# Patient Record
Sex: FEMALE | Race: WHITE | Employment: OTHER | RURAL
[De-identification: names, ages, dates, MRNs, and addresses within clinical notes are randomized per-mention and may not be internally consistent; named-entity substitution may affect disease eponyms.]

---

## 2017-01-12 ENCOUNTER — OFFICE VISIT (OUTPATIENT)
Dept: FAMILY MEDICINE CLINIC | Age: 77
End: 2017-01-12

## 2017-01-12 ENCOUNTER — TELEPHONE (OUTPATIENT)
Dept: FAMILY MEDICINE CLINIC | Age: 77
End: 2017-01-12

## 2017-01-12 VITALS
DIASTOLIC BLOOD PRESSURE: 72 MMHG | HEART RATE: 87 BPM | HEIGHT: 64 IN | SYSTOLIC BLOOD PRESSURE: 133 MMHG | RESPIRATION RATE: 20 BRPM | BODY MASS INDEX: 31.14 KG/M2 | WEIGHT: 182.4 LBS | TEMPERATURE: 98.4 F | OXYGEN SATURATION: 95 %

## 2017-01-12 DIAGNOSIS — J40 BRONCHITIS: Primary | ICD-10-CM

## 2017-01-12 RX ORDER — AZITHROMYCIN 250 MG/1
TABLET, FILM COATED ORAL
Qty: 6 TAB | Refills: 0 | Status: SHIPPED | OUTPATIENT
Start: 2017-01-12 | End: 2017-01-17

## 2017-01-12 NOTE — MR AVS SNAPSHOT
Visit Information Date & Time Provider Department Dept. Phone Encounter #  
 1/12/2017 10:20 AM Debora Ulloa MD [address] [id number] Upcoming Health Maintenance Date Due DTaP/Tdap/Td series (1 - Tdap) 10/24/1961 GLAUCOMA SCREENING Q2Y 10/24/2005 OSTEOPOROSIS SCREENING (DEXA) 10/24/2005 Pneumococcal 65+ High/Highest Risk (1 of 2 - PCV13) 10/24/2005 INFLUENZA AGE 9 TO ADULT 8/1/2016 MEDICARE YEARLY EXAM 5/6/2017 Allergies as of 1/12/2017  Review Complete On: 1/12/2017 By: Debora Ulloa MD  
  
 Severity Noted Reaction Type Reactions Bactrim [Sulfamethoprim Ds] Medium 06/08/2012   Intolerance Other (comments) Avoids due to mother's severe reaction- (has never had bactrim) Penicillins Medium 06/08/2012   Side Effect Other (comments) Severe headches Ciprofloxacin Low 06/08/2012   Side Effect Nausea Only Flagyl [Metronidazole] Low 06/08/2012   Side Effect Other (comments) Disoriented, unable to function normally Current Immunizations  Never Reviewed Name Date Influenza Vaccine 11/19/2013 Not reviewed this visit You Were Diagnosed With   
  
 Codes Comments Bronchitis    -  Primary ICD-10-CM: O29 ICD-9-CM: 270 Vitals BP Pulse Temp Resp Height(growth percentile) 133/72 (BP 1 Location: Right arm, BP Patient Position: Sitting) 87 98.4 °F (36.9 °C) (Temporal) 20 5' 4\" (1.626 m) Weight(growth percentile) SpO2 BMI OB Status Smoking Status 182 lb 6.4 oz (82.7 kg) 95% 31.31 kg/m2 Postmenopausal Current Every Day Smoker Vitals History BMI and BSA Data Body Mass Index Body Surface Area  
 31.31 kg/m 2 1.93 m 2 Preferred Pharmacy Pharmacy Name Phone [facility] [phone] Your Updated Medication List  
  
   
This list is accurate as of: 1/12/17 11:12 AM.  Always use your most recent med list. amLODIPine 5 mg tablet Commonly known as:  Nicole Perera TAKE ONE TABLET BY MOUTH EVERY DAY  
  
 aspirin 81 mg tablet Take 81 mg by mouth daily (after lunch). atorvastatin 40 mg tablet Commonly known as:  LIPITOR  
TAKE ONE TABLET BY MOUTH EVERY DAY FOR CHOLESTEROL  
  
 azithromycin 250 mg tablet Commonly known as:  Hector Reynaga Follow instructions  
  
 cyclobenzaprine 10 mg tablet Commonly known as:  FLEXERIL Take one twice a day as needed for back/neck spasms FLUoxetine 20 mg tablet Commonly known as:  PROzac Take 1 Tab by mouth daily. metoprolol tartrate 50 mg tablet Commonly known as:  LOPRESSOR  
TAKE 1/2 TABLET BY MOUTH 2 TIMES A DAY FOR BLOOD PRESSURE PriLOSEC OTC 20 mg tablet Generic drug:  omeprazole Take 40 mg by mouth daily. Indications: GASTROESOPHAGEAL REFLUX  
  
 promethazine 25 mg tablet Commonly known as:  PHENERGAN  
TAKE ONE TABLET BY MOUTH EVERY 8 HOURS AS NEEDED FOR NAUSEA Prescriptions Sent to Pharmacy Refills  
 azithromycin (ZITHROMAX) 250 mg tablet 0 Sig: Follow instructions Class: Normal  
 Pharmacy: [address] #: [phone] To-Do List   
 01/12/2017 Imaging:  XR CHEST PA LAT Please provide this summary of care documentation to your next provider. Your primary care clinician is listed as Wayne Tamayo. If you have any questions after today's visit, please call [phone].

## 2017-01-12 NOTE — PROGRESS NOTES
HISTORY OF PRESENT ILLNESS  Maribel Lundberg is a 68 y.o. female. Chief Complaint   Patient presents with    Cough     cough, congestion, ear pain, sore throat, bodyaches, runny nose       HPI  Sick for 4-5 d  With ear pain worse on right  Cough an d congestion    Hx of Lyme ds and Colon Ca    Review of Systems   Constitutional: Negative for fever. HENT: Positive for congestion, ear pain and sore throat. Respiratory: Positive for cough, sputum production (very little) and wheezing. Negative for shortness of breath. Gastrointestinal: Negative for diarrhea, nausea and vomiting. Past Medical History   Diagnosis Date    Anxiety      panic attacks    Arthritis     Cancer [facility]) 2012     hepatic flexure cancer; resected    Chronic pain      mid & upper back, shouldet    GERD (gastroesophageal reflux disease)      controlled with med    H/o Lyme disease 2015    Hematuria     Hypercholesterolemia     Hypertension     Other ill-defined conditions(799.89)      wet macular degeneration    Pneumonia      bronchitis 2011         pneumonia twice in life    Status post colonoscopy with polypectomy      Current Outpatient Prescriptions   Medication Sig Dispense Refill    azithromycin (ZITHROMAX) 250 mg tablet Follow instructions 6 Tab 0    metoprolol tartrate (LOPRESSOR) 50 mg tablet TAKE 1/2 TABLET BY MOUTH 2 TIMES A DAY FOR BLOOD PRESSURE 90 Tab 0    FLUoxetine (PROZAC) 20 mg tablet Take 1 Tab by mouth daily. 30 Tab 5    amLODIPine (NORVASC) 5 mg tablet TAKE ONE TABLET BY MOUTH EVERY DAY 90 Tab 0    atorvastatin (LIPITOR) 40 mg tablet TAKE ONE TABLET BY MOUTH EVERY DAY FOR CHOLESTEROL 90 Tab 1    omeprazole (PRILOSEC OTC) 20 mg tablet Take 40 mg by mouth daily. Indications: GASTROESOPHAGEAL REFLUX      aspirin 81 mg tablet Take 81 mg by mouth daily (after lunch).         promethazine (PHENERGAN) 25 mg tablet TAKE ONE TABLET BY MOUTH EVERY 8 HOURS AS NEEDED FOR NAUSEA 30 Tab 0    cyclobenzaprine (FLEXERIL) 10 mg tablet Take one twice a day as needed for back/neck spasms 60 Tab 1     Allergies   Allergen Reactions    Bactrim [Sulfamethoprim Ds] Other (comments)     Avoids due to mother's severe reaction- (has never had bactrim)    Penicillins Other (comments)     Severe headches    Ciprofloxacin Nausea Only    Flagyl [Metronidazole] Other (comments)     Disoriented, unable to function normally     Visit Vitals    /72 (BP 1 Location: Right arm, BP Patient Position: Sitting)    Pulse 87    Temp 98.4 °F (36.9 °C) (Temporal)    Resp 20    Ht 5' 4\" (1.626 m)    Wt 182 lb 6.4 oz (82.7 kg)    SpO2 95%    BMI 31.31 kg/m2       Physical Exam   Constitutional: She is oriented to person, place, and time. She appears well-developed and well-nourished. No distress. HENT:   Head: Normocephalic and atraumatic. Right Ear: External ear normal.   Left Ear: External ear normal.   Mouth/Throat: No oropharyngeal exudate (mild redness). Eyes: Conjunctivae and EOM are normal. Pupils are equal, round, and reactive to light. Cardiovascular: Normal rate, regular rhythm and normal heart sounds. Pulmonary/Chest: Effort normal. No respiratory distress. She has wheezes (few on left side). She has rales. Lymphadenopathy:     She has no cervical adenopathy. Neurological: She is alert and oriented to person, place, and time. Skin: Skin is warm and dry. Psychiatric: She has a normal mood and affect. Nursing note and vitals reviewed. ASSESSMENT and PLAN    ICD-10-CM ICD-9-CM    1.  Bronchitis J40 490 XR CHEST PA LAT      azithromycin (ZITHROMAX) 250 mg tablet   may add Robitussin DM and Claritin D for sy  RTC if not better or worse

## 2017-01-12 NOTE — PROGRESS NOTES
Chief Complaint   Patient presents with    Cough     cough, congestion, ear pain, sore throat, bodyaches, runny nose     Morning meds taken this Leighton Hernandez LPN

## 2017-01-17 DIAGNOSIS — J40 BRONCHITIS: ICD-10-CM

## 2017-01-27 ENCOUNTER — OFFICE VISIT (OUTPATIENT)
Dept: FAMILY MEDICINE CLINIC | Age: 77
End: 2017-01-27

## 2017-01-27 VITALS
RESPIRATION RATE: 20 BRPM | TEMPERATURE: 98.5 F | HEART RATE: 80 BPM | HEIGHT: 64 IN | OXYGEN SATURATION: 95 % | DIASTOLIC BLOOD PRESSURE: 75 MMHG | BODY MASS INDEX: 31.14 KG/M2 | WEIGHT: 182.4 LBS | SYSTOLIC BLOOD PRESSURE: 119 MMHG

## 2017-01-27 DIAGNOSIS — H69.91 EUSTACHIAN TUBE DISORDER, RIGHT: Primary | ICD-10-CM

## 2017-01-27 DIAGNOSIS — M54.2 NECK PAIN: ICD-10-CM

## 2017-01-27 DIAGNOSIS — F41.9 ANXIETY: ICD-10-CM

## 2017-01-27 RX ORDER — FLUTICASONE PROPIONATE 50 MCG
SPRAY, SUSPENSION (ML) NASAL
Qty: 1 BOTTLE | Refills: 0 | Status: SHIPPED | OUTPATIENT
Start: 2017-01-27 | End: 2020-01-17

## 2017-01-27 RX ORDER — HYDROXYZINE 25 MG/1
25 TABLET, FILM COATED ORAL
Qty: 30 TAB | Refills: 0 | Status: SHIPPED | OUTPATIENT
Start: 2017-01-27 | End: 2017-02-06

## 2017-01-27 NOTE — PROGRESS NOTES
HISTORY OF PRESENT ILLNESS  Loraine Marshall is a 68 y.o. female. Chief Complaint   Patient presents with    Ear Pain     right ear and neck pain       HPI  Better, but right ear still hurts since bronchitis  Was on Zpak, finished it 5 d ago  Bronchitis better  Pain under the ear  Raw  Not with pulling  Drains  No meds for sinus congestion  Taking saline    Stressed last week  And panic attack  Got chest pain with it  On Prozac  Prev on Diazepam prn  Daughter with ETOH and drug problem    Severa Mehul in June 2016 onto right side and right shoulder pain  Did not have Xray    Neck pain  Sharp at top of neck    Review of Systems   Constitutional: Negative for fever. HENT: Positive for congestion and ear pain. Negative for sore throat. No facial tenderness   Respiratory: Positive for cough (just a little left). Negative for shortness of breath and wheezing. Neurological: Positive for headaches (occ sinus pressure). Past Medical History   Diagnosis Date    Anxiety      panic attacks    Arthritis     Cancer [facility]) 2012     hepatic flexure cancer; resected    Chronic pain      mid & upper back, shouldet    GERD (gastroesophageal reflux disease)      controlled with med    H/o Lyme disease 2015    Hematuria     Hypercholesterolemia     Hypertension     Other ill-defined conditions(799.89)      wet macular degeneration    Pneumonia      bronchitis 2011         pneumonia twice in life    Status post colonoscopy with polypectomy      Current Outpatient Prescriptions   Medication Sig Dispense Refill    fluticasone (FLONASE) 50 mcg/actuation nasal spray Use 2 sprays in ea nostril once a day 1 Bottle 0    hydrOXYzine HCl (ATARAX) 25 mg tablet Take 1 Tab by mouth three (3) times daily as needed for Itching for up to 10 days.  Indications: ANXIETY 30 Tab 0    metoprolol tartrate (LOPRESSOR) 50 mg tablet TAKE 1/2 TABLET BY MOUTH 2 TIMES A DAY FOR BLOOD PRESSURE 90 Tab 0    FLUoxetine (PROZAC) 20 mg tablet Take 1 Tab by mouth daily. 30 Tab 5    amLODIPine (NORVASC) 5 mg tablet TAKE ONE TABLET BY MOUTH EVERY DAY 90 Tab 0    atorvastatin (LIPITOR) 40 mg tablet TAKE ONE TABLET BY MOUTH EVERY DAY FOR CHOLESTEROL 90 Tab 1    omeprazole (PRILOSEC OTC) 20 mg tablet Take 40 mg by mouth daily. Indications: GASTROESOPHAGEAL REFLUX      aspirin 81 mg tablet Take 81 mg by mouth daily (after lunch).  cyclobenzaprine (FLEXERIL) 10 mg tablet Take one twice a day as needed for back/neck spasms 60 Tab 1     Allergies   Allergen Reactions    Bactrim [Sulfamethoprim Ds] Other (comments)     Avoids due to mother's severe reaction- (has never had bactrim)    Penicillins Other (comments)     Severe headches    Ciprofloxacin Nausea Only    Flagyl [Metronidazole] Other (comments)     Disoriented, unable to function normally     Visit Vitals    /75 (BP 1 Location: Right arm, BP Patient Position: Sitting)    Pulse 80    Temp 98.5 °F (36.9 °C) (Temporal)    Resp 20    Ht 5' 4\" (1.626 m)    Wt 182 lb 6.4 oz (82.7 kg)    SpO2 95%    BMI 31.31 kg/m2       Physical Exam   Constitutional: She is oriented to person, place, and time. She appears well-developed and well-nourished. No distress. HENT:   Head: Normocephalic and atraumatic. Right Ear: External ear normal.   Left Ear: External ear normal.   Mouth/Throat: Oropharynx is clear and moist. No oropharyngeal exudate. Turbinates swollen   Eyes: Conjunctivae and EOM are normal.   Neck: Normal range of motion. Cardiovascular: Normal rate, regular rhythm and normal heart sounds. Pulmonary/Chest: Effort normal and breath sounds normal.   Musculoskeletal:   Tense Trapezius bilat   Lymphadenopathy:     She has no cervical adenopathy. Neurological: She is alert and oriented to person, place, and time. Skin: Skin is warm and dry. Psychiatric: She has a normal mood and affect. Nursing note and vitals reviewed. ASSESSMENT and PLAN    ICD-10-CM ICD-9-CM    1. Eustachian tube disorder, right H69.91 381.9 fluticasone (FLONASE) 50 mcg/actuation nasal spray   2. Anxiety F41.9 300.00 hydrOXYzine HCl (ATARAX) 25 mg tablet   3.  Neck pain M54.2 723.1    heat and Flexeril prn and trial of massage  Cont Prozac regularly

## 2017-01-27 NOTE — PROGRESS NOTES
Chief Complaint   Patient presents with    Ear Pain     right ear and neck pain     Morning meds taken this Adrian Maxwell LPN

## 2017-02-20 DIAGNOSIS — I10 ESSENTIAL HYPERTENSION: ICD-10-CM

## 2017-02-20 RX ORDER — AMLODIPINE BESYLATE 5 MG/1
TABLET ORAL
Qty: 90 TAB | Refills: 0 | Status: SHIPPED | OUTPATIENT
Start: 2017-02-20 | End: 2017-05-04 | Stop reason: SDUPTHER

## 2017-03-15 RX ORDER — METOPROLOL TARTRATE 50 MG/1
TABLET ORAL
Qty: 90 TAB | Refills: 0 | Status: SHIPPED | OUTPATIENT
Start: 2017-03-15 | End: 2017-06-13 | Stop reason: SDUPTHER

## 2017-03-21 RX ORDER — ATORVASTATIN CALCIUM 40 MG/1
TABLET, FILM COATED ORAL
Qty: 90 TAB | Refills: 0 | Status: SHIPPED | OUTPATIENT
Start: 2017-03-21 | End: 2017-06-17 | Stop reason: SDUPTHER

## 2017-05-04 ENCOUNTER — OFFICE VISIT (OUTPATIENT)
Dept: FAMILY MEDICINE CLINIC | Age: 77
End: 2017-05-04

## 2017-05-04 ENCOUNTER — TELEPHONE (OUTPATIENT)
Dept: FAMILY MEDICINE CLINIC | Age: 77
End: 2017-05-04

## 2017-05-04 VITALS
BODY MASS INDEX: 31.41 KG/M2 | HEIGHT: 64 IN | HEART RATE: 88 BPM | SYSTOLIC BLOOD PRESSURE: 137 MMHG | RESPIRATION RATE: 18 BRPM | DIASTOLIC BLOOD PRESSURE: 77 MMHG | WEIGHT: 184 LBS | TEMPERATURE: 96 F | OXYGEN SATURATION: 100 %

## 2017-05-04 DIAGNOSIS — I10 ESSENTIAL HYPERTENSION: ICD-10-CM

## 2017-05-04 DIAGNOSIS — M54.6 ACUTE BILATERAL THORACIC BACK PAIN: ICD-10-CM

## 2017-05-04 DIAGNOSIS — F41.9 ANXIETY AND DEPRESSION: ICD-10-CM

## 2017-05-04 DIAGNOSIS — R30.0 DYSURIA: Primary | ICD-10-CM

## 2017-05-04 DIAGNOSIS — F32.A ANXIETY AND DEPRESSION: ICD-10-CM

## 2017-05-04 DIAGNOSIS — M54.2 NECK PAIN: ICD-10-CM

## 2017-05-04 DIAGNOSIS — N30.90 CYSTITIS: ICD-10-CM

## 2017-05-04 LAB
BILIRUB UR QL STRIP: NEGATIVE
GLUCOSE UR-MCNC: NEGATIVE MG/DL
KETONES P FAST UR STRIP-MCNC: NEGATIVE MG/DL
PH UR STRIP: 5.5 [PH] (ref 4.6–8)
PROT UR QL STRIP: NEGATIVE MG/DL
SP GR UR STRIP: 1.02 (ref 1–1.03)
UA UROBILINOGEN AMB POC: NORMAL (ref 0.2–1)
URINALYSIS CLARITY POC: CLEAR
URINALYSIS COLOR POC: YELLOW
URINE BLOOD POC: NORMAL
URINE LEUKOCYTES POC: NORMAL
URINE NITRITES POC: NEGATIVE

## 2017-05-04 RX ORDER — IBUPROFEN 200 MG
TABLET ORAL
COMMUNITY
End: 2019-12-20

## 2017-05-04 RX ORDER — AMLODIPINE BESYLATE 5 MG/1
TABLET ORAL
Qty: 90 TAB | Refills: 1 | Status: SHIPPED | OUTPATIENT
Start: 2017-05-04 | End: 2017-11-14 | Stop reason: SDUPTHER

## 2017-05-04 RX ORDER — FLUOXETINE 20 MG/1
20 TABLET ORAL DAILY
Qty: 30 TAB | Refills: 5 | Status: SHIPPED | OUTPATIENT
Start: 2017-05-04 | End: 2020-01-17 | Stop reason: SDUPTHER

## 2017-05-04 RX ORDER — CEPHALEXIN 500 MG/1
500 CAPSULE ORAL 4 TIMES DAILY
Qty: 40 CAP | Refills: 0 | Status: SHIPPED | OUTPATIENT
Start: 2017-05-04 | End: 2017-05-14

## 2017-05-04 RX ORDER — NITROFURANTOIN 25; 75 MG/1; MG/1
100 CAPSULE ORAL 2 TIMES DAILY
Qty: 20 CAP | Refills: 0 | Status: SHIPPED | OUTPATIENT
Start: 2017-05-04 | End: 2017-07-07 | Stop reason: ALTCHOICE

## 2017-05-04 RX ORDER — GABAPENTIN 100 MG/1
CAPSULE ORAL
Qty: 100 CAP | Refills: 5 | Status: SHIPPED | OUTPATIENT
Start: 2017-05-04 | End: 2020-01-17

## 2017-05-04 NOTE — PROGRESS NOTES
Severiano Mediate is a 68 y.o. female who presents with the following:  Chief Complaint   Patient presents with    Ear Pain    Shoulder Pain    Nausea    Hypertension       Ear Pain   The history is provided by the patient (Patient has pain in the neck that radiates into her right ear and when it was massaged by her daughter caused her to be nauseated). Pertinent negatives include no chest pain, no abdominal pain, no headaches and no shortness of breath. Shoulder Pain    The history is provided by the patient (The patient has pain in her thoracic spine that radiates into her shoulders and has been tender over the spine itself). Pertinent negatives include no tingling. Nausea    The history is provided by the patient (The nausea seem to be caused by massaging the neck but is since cleared up). Associated symptoms include arthralgias, myalgias and cough. Pertinent negatives include no chills, no fever, no sweats, no abdominal pain, no diarrhea, no headaches, no URI and no headaches. Hypertension    The history is provided by the patient (Patient's blood pressure is been well controlled as it is today without any edema or chest pain. ). Associated symptoms include nausea. Pertinent negatives include no chest pain, no orthopnea, no palpitations, no PND, no blurred vision, no headaches, no peripheral edema, no dizziness and no shortness of breath. Urinary Pain    The history is provided by the patient (Patient has been having urinary pain and frequency). Associated symptoms include nausea and frequency. Pertinent negatives include no chills, no sweats and no abdominal pain.        Allergies   Allergen Reactions    Bactrim [Sulfamethoprim Ds] Other (comments)     Avoids due to mother's severe reaction- (has never had bactrim)    Penicillins Other (comments)     Severe headches    Ciprofloxacin Nausea Only    Flagyl [Metronidazole] Other (comments)     Disoriented, unable to function normally       Current Outpatient Prescriptions   Medication Sig    ibuprofen (ADVIL) 200 mg tablet Take  by mouth.  amLODIPine (NORVASC) 5 mg tablet TAKE ONE TABLET BY MOUTH EVERY DAY    FLUoxetine (PROZAC) 20 mg tablet Take 1 Tab by mouth daily.  gabapentin (NEURONTIN) 100 mg capsule Take 1-6 capsules nightly for pain    nitrofurantoin, macrocrystal-monohydrate, (MACROBID) 100 mg capsule Take 1 Cap by mouth two (2) times a day. Indications: BACTERIAL URINARY TRACT INFECTION    atorvastatin (LIPITOR) 40 mg tablet TAKE ONE TABLET BY MOUTH EVERY DAY FOR CHOLESTEROL    metoprolol tartrate (LOPRESSOR) 50 mg tablet TAKE 1/2 TABLET BY MOUTH 2 TIMES A DAY FOR BLOOD PRESSURE    cyclobenzaprine (FLEXERIL) 10 mg tablet Take one twice a day as needed for back/neck spasms    omeprazole (PRILOSEC OTC) 20 mg tablet Take 40 mg by mouth daily. Indications: GASTROESOPHAGEAL REFLUX    aspirin 81 mg tablet Take 81 mg by mouth daily (after lunch).  fluticasone (FLONASE) 50 mcg/actuation nasal spray Use 2 sprays in ea nostril once a day     No current facility-administered medications for this visit.         Past Medical History:   Diagnosis Date    Anxiety     panic attacks    Arthritis     Cancer ([facility] [address] 2012    hepatic flexure cancer; resected    Chronic pain     mid & upper back, shouldet    GERD (gastroesophageal reflux disease)     controlled with med    H/o Lyme disease 2015    Hematuria     Hypercholesterolemia     Hypertension     Other ill-defined conditions     wet macular degeneration    Pneumonia     bronchitis 2011         pneumonia twice in life    Status post colonoscopy with polypectomy        Past Surgical History:   Procedure Laterality Date    COLONOSCOPY N/A 9/21/2016    COLON[facility]amar [facility] AMBULATORY OR    HX GI      partial colectomy    HX OTHER SURGICAL      hemorrhoidectomy    HX TONSILLECTOMY      HX TUBAL LIGATION  1971       Family History   Problem Relation Age of Onset    Heart Disease Mother     Kidney Disease Father     Seizures Father        Social History     Social History    Marital status:      Spouse name: N/A    Number of children: N/A    Years of education: N/A     Social History Main Topics    Smoking status: Current Every Day Smoker     Packs/day: 0.50     Years: 56.00    Smokeless tobacco: Never Used    Alcohol use No    Drug use: No    Sexual activity: No     Other Topics Concern    None     Social History Narrative       Review of Systems   Constitutional: Negative for chills and fever. Eyes: Negative for blurred vision. Respiratory: Positive for cough. Negative for shortness of breath. Cardiovascular: Negative for chest pain, palpitations, orthopnea and PND. Gastrointestinal: Positive for nausea. Negative for abdominal pain and diarrhea. Genitourinary: Positive for dysuria and frequency. Musculoskeletal: Positive for arthralgias and myalgias. Neurological: Negative for dizziness, tingling and headaches. Visit Vitals    /77 (BP 1 Location: Left arm, BP Patient Position: Sitting)    Pulse 88    Temp 96 °F (35.6 °C) (Oral)    Resp 18    Ht 5' 4\" (1.626 m)    Wt 184 lb (83.5 kg)    SpO2 100%    BMI 31.58 kg/m2     Physical Exam   Constitutional: She is oriented to person, place, and time and well-developed, well-nourished, and in no distress. Patient is obese and is quite excitable today. HENT:   Head: Normocephalic and atraumatic. Right Ear: External ear normal.   Left Ear: External ear normal.   Mouth/Throat: Oropharynx is clear and moist.   Eyes: Conjunctivae and EOM are normal. Pupils are equal, round, and reactive to light. Right eye exhibits no discharge. Left eye exhibits no discharge. Neck: Normal range of motion. Neck supple. No tracheal deviation present. No thyromegaly present. Cardiovascular: Normal rate, regular rhythm, normal heart sounds and intact distal pulses.   Exam reveals no gallop and no friction rub. No murmur heard. Pulmonary/Chest: Effort normal and breath sounds normal. No respiratory distress. She has no wheezes. She exhibits no tenderness. Abdominal: Soft. Bowel sounds are normal. She exhibits no distension and no mass. There is no tenderness. There is no rebound and no guarding. Musculoskeletal: She exhibits tenderness (The patient is tender in her cervical and thoracic spine). She exhibits no edema. Lymphadenopathy:     She has no cervical adenopathy. Neurological: She is alert and oriented to person, place, and time. She has normal reflexes. No cranial nerve deficit. She exhibits normal muscle tone. Gait normal. Coordination normal.   Skin: Skin is warm and dry. No rash noted. No erythema. No pallor. Psychiatric: Mood, memory, affect and judgment normal.         ICD-10-CM ICD-9-CM    1. Dysuria R30.0 788.1 AMB POC URINALYSIS DIP STICK AUTO W/O MICRO   2. Essential hypertension I10 401.9 amLODIPine (NORVASC) 5 mg tablet   3. Anxiety and depression F41.9 300.00 FLUoxetine (PROZAC) 20 mg tablet    F32.9 311    4. Cystitis N30.90 595.9 AMB POC URINALYSIS DIP STICK AUTO W/O MICRO   5. Neck pain M54.2 723.1 XR SPINE CERV 4 OR 5 V      gabapentin (NEURONTIN) 100 mg capsule   6. Acute bilateral thoracic back pain M54.6 724.1 XR SPINE THORAC 3 V      gabapentin (NEURONTIN) 100 mg capsule       Orders Placed This Encounter    XR SPINE CERV 4 OR 5 V     Standing Status:   Future     Standing Expiration Date:   6/4/2018     Order Specific Question:   Reason for Exam     Answer:   Cervical pain     Order Specific Question:   Is Patient Allergic to Contrast Dye? Answer:   No    XR SPINE THORAC 3 V     Standing Status:   Future     Standing Expiration Date:   6/4/2018     Order Specific Question:   Reason for Exam     Answer:   Thoracic pain in the spine     Order Specific Question:   Is Patient Allergic to Contrast Dye?      Answer:   No    AMB POC URINALYSIS DIP STICK AUTO W/O MICRO    ibuprofen (ADVIL) 200 mg tablet     Sig: Take  by mouth.  amLODIPine (NORVASC) 5 mg tablet     Sig: TAKE ONE TABLET BY MOUTH EVERY DAY     Dispense:  90 Tab     Refill:  1    FLUoxetine (PROZAC) 20 mg tablet     Sig: Take 1 Tab by mouth daily. Dispense:  30 Tab     Refill:  5    gabapentin (NEURONTIN) 100 mg capsule     Sig: Take 1-6 capsules nightly for pain     Dispense:  100 Cap     Refill:  5    nitrofurantoin, macrocrystal-monohydrate, (MACROBID) 100 mg capsule     Sig: Take 1 Cap by mouth two (2) times a day. Indications: BACTERIAL URINARY TRACT INFECTION     Dispense:  20 Cap     Refill:  0    if there is significant arthritis in the spine I would strongly consider using a low dose of Medrol for 5 days to try to get it under control but the gabapentin may actually do the job by itself.     Follow-up Disposition: Not on Tasha Everett MD

## 2017-05-04 NOTE — MR AVS SNAPSHOT
Visit Information Date & Time Provider Department Dept. Phone Encounter #  
 5/4/2017  2:00 PM Ricky Painter MD [facility] Primary Care [phone] [id number] Upcoming Health Maintenance Date Due DTaP/Tdap/Td series (1 - Tdap) 10/24/1961 GLAUCOMA SCREENING Q2Y 10/24/2005 OSTEOPOROSIS SCREENING (DEXA) 10/24/2005 Pneumococcal 65+ High/Highest Risk (1 of 2 - PCV13) 10/24/2005 MEDICARE YEARLY EXAM 5/6/2017 INFLUENZA AGE 9 TO ADULT 8/1/2017 Allergies as of 5/4/2017  Review Complete On: 5/4/2017 By: Ricky Painter MD  
  
 Severity Noted Reaction Type Reactions Bactrim [Sulfamethoprim Ds] Medium 06/08/2012   Intolerance Other (comments) Avoids due to mother's severe reaction- (has never had bactrim) Penicillins Medium 06/08/2012   Side Effect Other (comments) Severe headches Ciprofloxacin Low 06/08/2012   Side Effect Nausea Only Flagyl [Metronidazole] Low 06/08/2012   Side Effect Other (comments) Disoriented, unable to function normally Current Immunizations  Never Reviewed Name Date Influenza Vaccine 11/19/2013 Not reviewed this visit You Were Diagnosed With   
  
 Codes Comments Dysuria    -  Primary ICD-10-CM: R30.0 ICD-9-CM: 788.1 Essential hypertension     ICD-10-CM: I10 
ICD-9-CM: 401.9 Anxiety and depression     ICD-10-CM: F41.9, F32.9 ICD-9-CM: 300.00, 311 Cystitis     ICD-10-CM: N30.90 ICD-9-CM: 595.9 Neck pain     ICD-10-CM: M54.2 ICD-9-CM: 723.1 Acute bilateral thoracic back pain     ICD-10-CM: M54.6 ICD-9-CM: 724.1 Vitals BP Pulse Temp Resp Height(growth percentile) Weight(growth percentile)  
 137/77 (BP 1 Location: Left arm, BP Patient Position: Sitting) 88 96 °F (35.6 °C) (Oral) 18 5' 4\" (1.626 m) 184 lb (83.5 kg) SpO2 BMI OB Status Smoking Status 100% 31.58 kg/m2 Postmenopausal Current Every Day Smoker Vitals History BMI and BSA Data Body Mass Index Body Surface Area 31.58 kg/m 2 1.94 m 2 Preferred Pharmacy Pharmacy Name Phone [place] [address] [phone] Your Updated Medication List  
  
   
This list is accurate as of: 5/4/17  2:51 PM.  Always use your most recent med list. ADVIL 200 mg tablet Generic drug:  ibuprofen Take  by mouth. amLODIPine 5 mg tablet Commonly known as:  [place] TAKE ONE TABLET BY MOUTH EVERY DAY  
  
 aspirin 81 mg tablet Take 81 mg by mouth daily (after lunch). atorvastatin 40 mg tablet Commonly known as:  LIPITOR  
TAKE ONE TABLET BY MOUTH EVERY DAY FOR CHOLESTEROL  
  
 cyclobenzaprine 10 mg tablet Commonly known as:  FLEXERIL Take one twice a day as needed for back/neck spasms FLUoxetine 20 mg tablet Commonly known as:  PROzac Take 1 Tab by mouth daily. fluticasone 50 mcg/actuation nasal spray Commonly known as:  [place] Use 2 sprays in ea nostril once a day  
  
 gabapentin 100 mg capsule Commonly known as:  NEURONTIN Take 1-6 capsules nightly for pain  
  
 metoprolol tartrate 50 mg tablet Commonly known as:  LOPRESSOR  
TAKE 1/2 TABLET BY MOUTH 2 TIMES A DAY FOR BLOOD PRESSURE PriLOSEC OTC 20 mg tablet Generic drug:  omeprazole Take 40 mg by mouth daily. Indications: GASTROESOPHAGEAL REFLUX Prescriptions Sent to Pharmacy Refills  
 amLODIPine (NORVASC) 5 mg tablet 1 Sig: TAKE ONE TABLET BY MOUTH EVERY DAY Class: Normal  
 Pharmacy: [place] [address] Ph #: [phone] FLUoxetine (PROZAC) 20 mg tablet 5 Sig: Take 1 Tab by mouth daily. Class: Normal  
 Pharmacy: [place] [address] Ph #: [phone] Route: Oral  
 gabapentin (NEURONTIN) 100 mg capsule 5  Sig: Take 1-6 capsules nightly for pain  
 Class: Normal  
 Pharmacy: [facility]  #: [phone] We Performed the Following AMB POC URINALYSIS DIP STICK AUTO W/O MICRO [64013 CPT(R)] To-Do List   
 06/04/2017 Imaging:  XR SPINE CERV 4 OR 5 V   
  
 06/04/2017 Imaging:  XR SPINE THORAC 3 V Please provide this summary of care documentation to your next provider. Your primary care clinician is listed as Wiley Martinez. If you have any questions after today's visit, please call [phone].

## 2017-05-04 NOTE — TELEPHONE ENCOUNTER
Needs prior Auth  Drug: Macrobid  Please call [phone]   Patient called stating she's taken keflex & amoxicillin in the past and it has worked.

## 2017-05-06 LAB — BACTERIA UR CULT: NO GROWTH

## 2017-05-09 DIAGNOSIS — M47.812 ARTHRITIS OF NECK: Primary | ICD-10-CM

## 2017-05-10 ENCOUNTER — DOCUMENTATION ONLY (OUTPATIENT)
Dept: FAMILY MEDICINE CLINIC | Age: 77
End: 2017-05-10

## 2017-06-13 RX ORDER — METOPROLOL TARTRATE 50 MG/1
TABLET ORAL
Qty: 90 TAB | Refills: 0 | Status: SHIPPED | OUTPATIENT
Start: 2017-06-13 | End: 2020-01-17 | Stop reason: SDUPTHER

## 2017-06-17 RX ORDER — ATORVASTATIN CALCIUM 40 MG/1
TABLET, FILM COATED ORAL
Qty: 90 TAB | Refills: 0 | Status: SHIPPED | OUTPATIENT
Start: 2017-06-17 | End: 2017-09-16 | Stop reason: SDUPTHER

## 2017-07-07 ENCOUNTER — OFFICE VISIT (OUTPATIENT)
Dept: FAMILY MEDICINE CLINIC | Age: 77
End: 2017-07-07

## 2017-07-07 VITALS
WEIGHT: 181 LBS | TEMPERATURE: 96.7 F | SYSTOLIC BLOOD PRESSURE: 125 MMHG | HEART RATE: 78 BPM | BODY MASS INDEX: 30.9 KG/M2 | HEIGHT: 64 IN | OXYGEN SATURATION: 97 % | RESPIRATION RATE: 20 BRPM | DIASTOLIC BLOOD PRESSURE: 78 MMHG

## 2017-07-07 DIAGNOSIS — L82.1 SEBORRHEIC KERATOSES: ICD-10-CM

## 2017-07-07 DIAGNOSIS — B37.2 CANDIDAL INTERTRIGO: Primary | ICD-10-CM

## 2017-07-07 DIAGNOSIS — Z00.00 MEDICARE ANNUAL WELLNESS VISIT, SUBSEQUENT: ICD-10-CM

## 2017-07-07 RX ORDER — NYSTATIN 100000 U/G
CREAM TOPICAL 2 TIMES DAILY
Qty: 15 G | Refills: 0 | Status: SHIPPED | OUTPATIENT
Start: 2017-07-07 | End: 2019-12-20

## 2017-07-07 NOTE — PROGRESS NOTES
Peter Singh is a 68 y.o. female who presents to the office today with the following:  Chief Complaint   Patient presents with    Rash     groin area, 1 year, worse    Annual Wellness Visit            HPI  Noticed yeast infection like rash on groin intermittent x 1 year. Would feel \"just raw\" under abdomen, says areas stays moist due to \"flap\". Would use otc Monistat and would clear up. Returned with bumps and otc would not work anymore. Has also tried some Peroxide which helped initially. Now is progressively worsening. Is uncomfortable, but not painful. Irritated and itchy. Otherwise feeling well with no other complaints or acute concerns. Review of Systems   Constitutional: Negative for chills and fever. Respiratory: Negative for cough and shortness of breath. Cardiovascular: Negative for chest pain. Gastrointestinal: Negative. Genitourinary: Negative. Skin: Positive for itching and rash. Neurological: Negative for headaches. See HPI. Allergies   Allergen Reactions    Bactrim [Sulfamethoprim Ds] Other (comments)     Avoids due to mother's severe reaction- (has never had bactrim)    Penicillins Other (comments)     Severe headches    Ciprofloxacin Nausea Only    Flagyl [Metronidazole] Other (comments)     Disoriented, unable to function normally       Current Outpatient Prescriptions   Medication Sig    nystatin (MYCOSTATIN) topical cream Apply  to affected area two (2) times a day.  atorvastatin (LIPITOR) 40 mg tablet TAKE ONE TABLET BY MOUTH EVERY DAY FOR CHOLESTEROL    metoprolol tartrate (LOPRESSOR) 50 mg tablet TAKE 1/2 TABLET BY MOUTH 2 TIMES A DAY FOR BLOOD PRESSURE    ibuprofen (ADVIL) 200 mg tablet Take  by mouth.  amLODIPine (NORVASC) 5 mg tablet TAKE ONE TABLET BY MOUTH EVERY DAY    FLUoxetine (PROZAC) 20 mg tablet Take 1 Tab by mouth daily.     cyclobenzaprine (FLEXERIL) 10 mg tablet Take one twice a day as needed for back/neck spasms    omeprazole (PRILOSEC OTC) 20 mg tablet Take 40 mg by mouth daily. Indications: GASTROESOPHAGEAL REFLUX    aspirin 81 mg tablet Take 81 mg by mouth daily (after lunch).  gabapentin (NEURONTIN) 100 mg capsule Take 1-6 capsules nightly for pain    fluticasone (FLONASE) 50 mcg/actuation nasal spray Use 2 sprays in ea nostril once a day     No current facility-administered medications for this visit.         Past Medical History:   Diagnosis Date    Anxiety     panic attacks    Arthritis     Cancer ([facility] [address] 2012    hepatic flexure cancer; resected    Chronic pain     mid & upper back, shouldet    GERD (gastroesophageal reflux disease)     controlled with med    H/o Lyme disease 2015    Hematuria     Hypercholesterolemia     Hypertension     Other ill-defined conditions     wet macular degeneration    Pneumonia     bronchitis 2011         pneumonia twice in life    Status post colonoscopy with polypectomy        Past Surgical History:   Procedure Laterality Date    COLONOSCOPY N/A 9/21/2016    COL[facility] AMBUL[facility] OR    HX GI      partial colectomy    HX OTHER SURGICAL      hemorrhoidectomy    HX TONSILLECTOMY      HX TUBAL LIGATION  1971       Social History     Social History    Marital status:      Spouse name: N/A    Number of children: N/A    Years of education: N/A     Social History Main Topics    Smoking status: Current Every Day Smoker     Packs/day: 0.50     Years: 56.00    Smokeless tobacco: Never Used    Alcohol use No    Drug use: No    Sexual activity: No     Other Topics Concern    None     Social History Narrative       Family History   Problem Relation Age of Onset    Heart Disease Mother     Kidney Disease Father     Seizures Father          Physical Exam:  Visit Vitals    /78 (BP 1 Location: Right arm, BP Patient Position: Sitting)    Pulse 78    Temp 96.7 °F (35.9 °C) (Temporal)    Resp 20    Ht 5' 4\" (1.626 m)    Wt 181 lb (82.1 kg)    SpO2 97%    BMI 31.07 kg/m2     Physical Exam   Constitutional: She is oriented to person, place, and time and well-developed, well-nourished, and in no distress. Obese WF   HENT:   Head: Normocephalic and atraumatic. Eyes: Conjunctivae and EOM are normal.   Neck: Normal range of motion. Neck supple. Cardiovascular: Normal rate and regular rhythm. Pulmonary/Chest: Effort normal and breath sounds normal.   Abdominal: Soft. There is no tenderness. Lymphadenopathy:     She has no cervical adenopathy. Neurological: She is alert and oriented to person, place, and time. Gait normal.   Skin: Skin is warm and dry. Mild erythema lining diffuse intertriginous region under panus. Also with diffuse \"stuck on\" brown/light tan colored warty lesions, consistent with seb k's to diffuse abdomen/trunk. Lesions consistent with AK (small white scale with light erythematous base) also noted on chest pt reports has discussed removal with her PCP. Psychiatric: Mood and affect normal.   Nursing note and vitals reviewed. Assessment/Plan:    ICD-10-CM ICD-9-CM    1. Candidal intertrigo B37.2 112.3 nystatin (MYCOSTATIN) topical cream      REFERRAL TO DERMATOLOGY   2. Seborrheic keratoses L82.1 702.19 REFERRAL TO DERMATOLOGY   3. Medicare annual wellness visit, subsequent Z00.00 V70.0      Recommend tx with topical antifungal, keep area clean/dry, wear loose and breathable clothing. Also discussed health diet and wt loss. She also needs to be seen for tx of other skin lesions and full spot check. Discussed removal and pt would like to go to Dermatology. She has many seb k's to her abdomen and trunk, also with few actinic keratosis looking lesions on chest she say she has already discussed with PCP who tells her is pre-CA. She would like referral, but unsure where she wants to go and will call Monday to let us know location preference.     Follow-up Disposition:  Return if symptoms worsen or fail to improve.     Alannah Ortiz PA-C

## 2017-07-07 NOTE — PATIENT INSTRUCTIONS
Schedule of Personalized Health Plan  (Provide Copy to Patient)  The best way to stay healthy is to live a healthy lifestyle. A healthy lifestyle includes regular exercise, eating a well-balanced diet, keeping a healthy weight and not smoking. Regular physical exams and screening tests are another important way to take care of yourself. Preventive exams provided by health care providers can find health problems early when treatment works best and can keep you from getting certain diseases or illnesses. Preventive services include exams, lab tests, screenings, shots, monitoring and information to help you take care of your own health. All people over 65 should have a pneumonia shot. Pneumonia shots are usually only needed once in a lifetime unless your doctor decides differently. All people over 65 should have a yearly flu shot. People over 65 are at medium to high risk for Hepatitis B. Three shots are needed for complete protection. In addition to your physical exam, some screening tests are recommended:    Bone mass measurement (dexa scan) is recommended every two years  Diabetes Mellitus screening is recommended every year. Glaucoma is an eye disease caused by high pressure in the eye. An eye exam is recommended every year. Cardiovascular screening tests that check your cholesterol and other blood fat (lipid) levels are recommended every five years. Colorectal Cancer screening tests help to find pre-cancerous polyps (growths in the colon) so they can be removed before they turn into cancer. Tests ordered for screening depend on your personal and family history risk factors.     Screening for Breast Cancer is recommended yearly with a mammogram.    Screening for Cervical Cancer is recommended every two years (annually for certain risk factors, such as previous history of STD or abnormal PAP in past 7 years), with a Pelvic Exam with PAP    Here is a list of your current Health Maintenance items with a due date:  Health Maintenance   Topic Date Due    DTaP/Tdap/Td series (1 - Tdap) 10/24/1961    GLAUCOMA SCREENING Q2Y  10/24/2005    OSTEOPOROSIS SCREENING (DEXA)  10/24/2005    Pneumococcal 65+ High/Highest Risk (2 of 2 - PCV13) 03/28/2009    MEDICARE YEARLY EXAM  05/06/2017    INFLUENZA AGE 9 TO ADULT  08/01/2017    ZOSTER VACCINE AGE 60>  Addressed       Patient given ACP to look over. Candidiasis: Care Instructions  Your Care Instructions  Candidiasis (say \"piq-lfw-MW-uh-raquel\") is a yeast infection. Yeast normally lives in your body. But it can cause problems if your body's defenses don't work as they should. Some medicines can increase your chance of getting a yeast infection. These include antibiotics, steroids, and cancer drugs. And some diseases like AIDS and diabetes can make you more likely to get yeast infections. There are different types of yeast infections. Hanh Tanghead is a yeast infection in the mouth. It usually occurs in people with weak immune systems. It causes white patches inside the mouth and throat. Yeast infections of the skin usually occur in skin folds where the skin stays moist. They cause red, oozing patches on your skin. Babies can get these infections under the diaper. People who often wear gloves can get them on their hands. Many women get vaginal yeast infections. They are most common when women take antibiotics. These infections can cause the vagina to itch and burn. They also cause white discharge that looks like cottage cheese. In rare cases, yeast infects the blood. This can cause serious disease. This kind of infection is treated with medicine given through a needle into a vein (IV). After you start treatment, a yeast infection usually goes away quickly. But if your immune system is weak, the infection may come back. Tell your doctor if you get yeast infections often. Follow-up care is a key part of your treatment and safety.  Be sure to make and go to all appointments, and call your doctor if you are having problems. It's also a good idea to know your test results and keep a list of the medicines you take. How can you care for yourself at home? · Take your medicines exactly as prescribed. Call your doctor if you think you are having a problem with your medicine. · Use antibiotics only as directed by your doctor. · Eat yogurt with live cultures. It has bacteria called lactobacillus. It may help prevent some types of yeast infections. · Keep your skin clean and dry. Put powder on moist places. · If you are using a cream or suppository to treat a vaginal yeast infection, don't use condoms or a diaphragm. Use a different type of birth control. · Eat a healthy diet and get regular exercise. This will help keep your immune system strong. When should you call for help? Call your doctor now or seek immediate medical care if:  · You have a fever. · You are pregnant and have signs of a vaginal or urinary tract infection such as:  ¨ Severe itching in your vagina. ¨ Pain during sex or when you urinate. ¨ Unusual discharge from your vagina. ¨ A frequent urge to urinate. ¨ Urine that is cloudy or smells bad. Watch closely for changes in your health, and be sure to contact your doctor if:  · You do not get better as expected. Where can you learn more? Go to http://christel-renuka.info/. Enter U516 in the search box to learn more about \"Candidiasis: Care Instructions. \"  Current as of: October 13, 2016  Content Version: 11.3  © 5041-6135 [employer]. Care instructions adapted under license by [employer] (which disclaims liability or warranty for this information). If you have questions about a medical condition or this instruction, always ask your healthcare professional. [place] [address] any warranty or liability for your use of this information.        Well Visit, Over 72: Care Instructions  Your Care Instructions  Physical exams can help you stay healthy. Your doctor has checked your overall health and may have suggested ways to take good care of yourself. He or she also may have recommended tests. At home, you can help prevent illness with healthy eating, regular exercise, and other steps. Follow-up care is a key part of your treatment and safety. Be sure to make and go to all appointments, and call your doctor if you are having problems. It's also a good idea to know your test results and keep a list of the medicines you take. How can you care for yourself at home? · Reach and stay at a healthy weight. This will lower your risk for many problems, such as obesity, diabetes, heart disease, and high blood pressure. · Get at least 30 minutes of exercise on most days of the week. Walking is a good choice. You also may want to do other activities, such as running, swimming, cycling, or playing tennis or team sports. · Do not smoke. Smoking can make health problems worse. If you need help quitting, talk to your doctor about stop-smoking programs and medicines. These can increase your chances of quitting for good. · Protect your skin from too much sun. When you're outdoors from 10 a.m. to 4 p.m., stay in the shade or cover up with clothing and a hat with a wide brim. Wear sunglasses that block UV rays. Even when it's cloudy, put broad-spectrum sunscreen (SPF 30 or higher) on any exposed skin. · See a dentist one or two times a year for checkups and to have your teeth cleaned. · Wear a seat belt in the car. · Limit alcohol to 2 drinks a day for men and 1 drink a day for women. Too much alcohol can cause health problems. Follow your doctor's advice about when to have certain tests. These tests can spot problems early. For men and women  · Cholesterol.  Your doctor will tell you how often to have this done based on your overall health and other things that can increase your risk for heart attack and stroke. · Blood pressure. Have your blood pressure checked during a routine doctor visit. Your doctor will tell you how often to check your blood pressure based on your age, your blood pressure results, and other factors. · Diabetes. Ask your doctor whether you should have tests for diabetes. · Vision. Experts recommend that you have yearly exams for glaucoma and other age-related eye problems. · Hearing. Tell your doctor if you notice any change in your hearing. You can have tests to find out how well you hear. · Colon cancer tests. Keep having colon cancer tests as your doctor recommends. You can have one of several types of tests. · Heart attack and stroke risk. At least every 4 to 6 years, you should have your risk for heart attack and stroke assessed. Your doctor uses factors such as your age, blood pressure, cholesterol, and whether you smoke or have diabetes to show what your risk for a heart attack or stroke is over the next 10 years. · Osteoporosis. Talk to your doctor about whether you should have a bone density test to find out whether you have thinning bones. Also ask your doctor about whether you should take calcium and vitamin D supplements. For women  · Pap test and pelvic exam. You may no longer need a Pap test. Talk with your doctor about whether to stop or continue to have Pap tests. · Breast exam and mammogram. Ask how often you should have a mammogram, which is an X-ray of your breasts. A mammogram can spot breast cancer before it can be felt and when it is easiest to treat. · Thyroid disease. Talk to your doctor about whether to have your thyroid checked as part of a regular physical exam. Women have an increased chance of a thyroid problem. For men  · Prostate exam. Talk to your doctor about whether you should have a blood test (called a PSA test) for prostate cancer.  Experts disagree on whether men should have this test. Some experts recommend that you discuss the benefits and risks of the test with your doctor. · Abdominal aortic aneurysm. Ask your doctor whether you should have a test to check for an aneurysm. You may need a test if you ever smoked or if your parent, brother, sister, or child has had an aneurysm. When should you call for help? Watch closely for changes in your health, and be sure to contact your doctor if you have any problems or symptoms that concern you. Where can you learn more? Go to http://christel-renuka.info/. Enter D740 in the search box to learn more about \"Well Visit, Over 65: Care Instructions. \"  Current as of: July 19, 2016  Content Version: 11.3  © 9938-4834 [employer]. Care instructions adapted under license by [employer] (which disclaims liability or warranty for this information). If you have questions about a medical condition or this instruction, always ask your healthcare professional. [place] [address] any warranty or liability for your use of this information.

## 2017-07-07 NOTE — PROGRESS NOTES
Daija Castro is a 68 y.o. female and presents for annual Medicare Wellness Visit. Problem List: Reviewed with patient and discussed risk factors. Patient Active Problem List   Diagnosis Code    [facility] C18.[facility]    History of colon cancer, stage III Z85.038    Diverticulosis of sigmoid colon K57.30    Hypercholesterolemia E78.00    Hypertension I10    Encounter for colonoscopy due to history of colon cancer Z12.11, Z85.038       Current medical providers:  Patient Care Team:  Henri Balderas MD as PCP - General (Family Practice)  Konstantin Stout MD ([facility])    PSH: Reviewed with patient  Past Surgical History:   Procedure Laterality Date    COLONOSCOPY N/A 9/21/2016    COLONOSCOPY performed by Dionna Roach MD at [address] HX GI      partial colectomy    HX OTHER SURGICAL      hemorrhoidectomy    HX TONSILLECTOMY      HX TUBAL LIGATION  1971        SH: Reviewed with patient  Social History   Substance Use Topics    Smoking status: Current Every Day Smoker     Packs/day: 0.50     Years: 56.00    Smokeless tobacco: Never Used    Alcohol use No       FH: Reviewed with patient  Family History   Problem Relation Age of Onset    Heart Disease Mother     Kidney Disease Father     Seizures Father        Medications/Allergies: Reviewed with patient  Current Outpatient Prescriptions on File Prior to Visit   Medication Sig Dispense Refill    atorvastatin (LIPITOR) 40 mg tablet TAKE ONE TABLET BY MOUTH EVERY DAY FOR CHOLESTEROL 90 Tab 0    metoprolol tartrate (LOPRESSOR) 50 mg tablet TAKE 1/2 TABLET BY MOUTH 2 TIMES A DAY FOR BLOOD PRESSURE 90 Tab 0    ibuprofen (ADVIL) 200 mg tablet Take  by mouth.  amLODIPine (NORVASC) 5 mg tablet TAKE ONE TABLET BY MOUTH EVERY DAY 90 Tab 1    FLUoxetine (PROZAC) 20 mg tablet Take 1 Tab by mouth daily.  30 Tab 5    cyclobenzaprine (FLEXERIL) 10 mg tablet Take one twice a day as needed for back/neck spasms 60 Tab 1    omeprazole (PRILOSEC OTC) 20 mg tablet Take 40 mg by mouth daily. Indications: GASTROESOPHAGEAL REFLUX      aspirin 81 mg tablet Take 81 mg by mouth daily (after lunch).  gabapentin (NEURONTIN) 100 mg capsule Take 1-6 capsules nightly for pain 100 Cap 5    fluticasone (FLONASE) 50 mcg/actuation nasal spray Use 2 sprays in ea nostril once a day 1 Bottle 0     No current facility-administered medications on file prior to visit. Allergies   Allergen Reactions    Bactrim [Sulfamethoprim Ds] Other (comments)     Avoids due to mother's severe reaction- (has never had bactrim)    Penicillins Other (comments)     Severe headches    Ciprofloxacin Nausea Only    Flagyl [Metronidazole] Other (comments)     Disoriented, unable to function normally       Objective: There were no vitals taken for this visit. There is no height or weight on file to calculate BMI. Assessment of cognitive impairment: Alert and oriented x 3    Depression Screen:   PHQ over the last two weeks 7/7/2017   PHQ Not Done -   Little interest or pleasure in doing things Not at all   Feeling down, depressed or hopeless Not at all   Total Score PHQ 2 0       Fall Risk Assessment:    Fall Risk Assessment, last 12 mths 7/7/2017   Able to walk? Yes   Fall in past 12 months? No   Fall with injury? -   Number of falls in past 12 months -   Fall Risk Score -       Functional Ability:   Does the patient exhibit a steady gait? yes   How long did it take the patient to get up and walk from a sitting position? 2sec     Is the patient self reliant?  (ie can do own laundry, meals, household chores)  yes     Does the patient handle his/her own medications? yes     Does the patient handle his/her own money? yes     Is the patients home safe (ie good lighting, handrails on stairs and bath, etc.)? yes     Did you notice or did patient express any hearing difficulties?    no     Did you notice or did patient express any vision difficulties?   no     Were distance and reading eye charts used? no       Advance Care Planning:   Patient was offered the opportunity to discuss advance care planning:  yes     Does patient have an Advance Directive:  no   If no, did you provide information on Caring Connections? yes       Plan:      No orders of the defined types were placed in this encounter. Health Maintenance   Topic Date Due    DTaP/Tdap/Td series (1 - Tdap) 10/24/1961    GLAUCOMA SCREENING Q2Y  10/24/2005    OSTEOPOROSIS SCREENING (DEXA)  10/24/2005      Pneumococcal 65+ High/Highest Risk (2 of 2 - PCV13) 03/28/2009    MEDICARE YEARLY EXAM  05/06/2017 done 7/7/17    INFLUENZA AGE 9 TO ADULT  08/01/2017    ZOSTER VACCINE AGE 60>  Addressed  Patient advised       *Patient verbalized understanding and agreement with the plan. A copy of the After Visit Summary with personalized health plan was given to the patient today.

## 2017-07-17 ENCOUNTER — TELEPHONE (OUTPATIENT)
Dept: FAMILY MEDICINE CLINIC | Age: 77
End: 2017-07-17

## 2017-07-17 ENCOUNTER — DOCUMENTATION ONLY (OUTPATIENT)
Dept: FAMILY MEDICINE CLINIC | Age: 77
End: 2017-07-17

## 2017-08-23 NOTE — PROGRESS NOTES
Appointment made with Dr Luz Slider 08/21/2017 at [facility] faxed last note and insurance cards CONSULT

## 2017-09-16 RX ORDER — ATORVASTATIN CALCIUM 40 MG/1
TABLET, FILM COATED ORAL
Qty: 90 TAB | Refills: 0 | Status: SHIPPED | OUTPATIENT
Start: 2017-09-16 | End: 2017-12-11 | Stop reason: SDUPTHER

## 2017-12-11 RX ORDER — ATORVASTATIN CALCIUM 40 MG/1
TABLET, FILM COATED ORAL
Qty: 90 TAB | Refills: 0 | Status: SHIPPED | OUTPATIENT
Start: 2017-12-11 | End: 2020-01-17 | Stop reason: SDUPTHER

## 2019-03-15 RX ORDER — PROMETHAZINE HYDROCHLORIDE 25 MG/1
TABLET ORAL
Qty: 30 TAB | Refills: 0 | Status: SHIPPED | OUTPATIENT
Start: 2019-03-15 | End: 2019-12-20

## 2019-12-20 PROBLEM — F41.9 ANXIETY AND DEPRESSION: Status: ACTIVE | Noted: 2017-09-19

## 2019-12-20 PROBLEM — H35.3230 BILATERAL EXUDATIVE AGE-RELATED MACULAR DEGENERATION (HCC): Status: ACTIVE | Noted: 2019-12-20

## 2019-12-20 PROBLEM — F32.A ANXIETY AND DEPRESSION: Status: ACTIVE | Noted: 2017-09-19

## 2020-07-20 PROBLEM — M81.0 AGE-RELATED OSTEOPOROSIS WITHOUT CURRENT PATHOLOGICAL FRACTURE: Status: ACTIVE | Noted: 2020-07-20

## 2020-08-13 ENCOUNTER — PATIENT OUTREACH (OUTPATIENT)
Dept: CASE MANAGEMENT | Age: 80
End: 2020-08-13

## 2020-08-13 NOTE — PROGRESS NOTES
Patient contacted regarding recent discharge and COVID-19 risk. Did not discuss COVID-19 related testing which was not done at this time. Ambulatory Care Manager contacted the patient by telephone to perform post discharge assessment. Verified name and  with patient as identifiers. Patient has following risk factors of: COPD and ED visit 20. ACM reviewed discharge instructions, medical action plan and red flags related to discharge diagnosis. There were no new or changed medications related to discharge diagnosis. Advance Care Planning:   Does patient have an Advance Directive: not on file; education provided     Patient verified healthcare decision makers as correctly listed in chart:  Nitesh Osuna (daughter) [phone], Heidy Ambika (son) [phone], and Caesar Garcia (daughter) [phone]    Education provided regarding infection prevention, and signs and symptoms of COVID-19 and when to seek medical attention with patient who verbalized understanding. Discussed exposure protocols and quarantine from [address] you at higher risk for severe illness  and given an opportunity for questions and concerns. The patient agrees to contact the COVID-19 hotline [phone] or PCP office for questions related to their healthcare. ACM provided contact information for future reference. From CDC: Are you at higher risk for severe illness?  Wash your hands often.  Avoid close contact (6 feet, which is about two arm lengths) with people who are sick.  Put distance between yourself and other people if COVID-19 is spreading in your community.  Clean and disinfect frequently touched surfaces.  Avoid all cruise travel and non-essential air travel.  Call your healthcare professional if you have concerns about COVID-19 and your underlying condition or if you are sick.     For more information on steps you can take to protect yourself, see CDC's How to Protect Yourself Patient/family/caregiver given information for [employer] and agrees to enroll no    Plan for follow-up call in 7-14 days based on severity of symptoms and risk factors.     Phillip Feliciano RN  Ambulatory Care Manager

## 2021-01-27 ENCOUNTER — NURSE TRIAGE (OUTPATIENT)
Dept: OTHER | Facility: CLINIC | Age: 81
End: 2021-01-27

## 2021-01-27 NOTE — TELEPHONE ENCOUNTER
Patient wants to know if she should be tested for Covid since her daughter is positive (very little interaction). Patient is currently being treated by her PCP for an URI. Referred to primary care for advice.

## 2021-01-29 ENCOUNTER — OFFICE VISIT (OUTPATIENT)
Dept: PRIMARY CARE CLINIC | Age: 81
End: 2021-01-29

## 2021-01-29 DIAGNOSIS — Z20.822 ENCOUNTER FOR LABORATORY TESTING FOR COVID-19 VIRUS: Primary | ICD-10-CM

## 2021-01-29 NOTE — PROGRESS NOTES
Pt presents to the flu clinic today requesting a covid test. Pt denies symptoms but has had a possible exposure. Pt declined to see a provider.  Verbal consent received to perform test. ALMA

## 2021-01-31 LAB — SARS-COV-2, NAA: NOT DETECTED

## 2021-02-12 NOTE — PROGRESS NOTES
I have called to give this patient her results. No answer and her VMB is full. Unable to leave a message.

## 2021-03-02 NOTE — PROGRESS NOTES
I have tried again to call this patient, no answer and VMB is full. I see that a letter was sent to the patient. No further actions are needed.

## 2021-03-23 ENCOUNTER — APPOINTMENT (OUTPATIENT)
Dept: CT IMAGING | Age: 81
End: 2021-03-23
Attending: EMERGENCY MEDICINE
Payer: MEDICARE

## 2021-03-23 ENCOUNTER — HOSPITAL ENCOUNTER (EMERGENCY)
Age: 81
Discharge: HOME | End: 2021-03-23
Attending: EMERGENCY MEDICINE
Payer: MEDICARE

## 2021-03-23 VITALS
WEIGHT: 175 LBS | DIASTOLIC BLOOD PRESSURE: 88 MMHG | OXYGEN SATURATION: 97 % | SYSTOLIC BLOOD PRESSURE: 146 MMHG | TEMPERATURE: 98.5 F | BODY MASS INDEX: 29.88 KG/M2 | HEART RATE: 88 BPM | RESPIRATION RATE: 18 BRPM | HEIGHT: 64 IN

## 2021-03-23 DIAGNOSIS — K59.00 CONSTIPATION, UNSPECIFIED CONSTIPATION TYPE: Primary | ICD-10-CM

## 2021-03-23 DIAGNOSIS — R10.12 ABDOMINAL PAIN, LUQ (LEFT UPPER QUADRANT): ICD-10-CM

## 2021-03-23 LAB
ALBUMIN SERPL-MCNC: 3.5 G/DL (ref 3.5–5)
ALBUMIN/GLOB SERPL: 1.1 {RATIO} (ref 1.1–2.2)
ALP SERPL-CCNC: 99 U/L (ref 45–117)
ALT SERPL-CCNC: 17 U/L (ref 12–78)
ANION GAP SERPL CALC-SCNC: 10 MMOL/L (ref 5–15)
AST SERPL-CCNC: 13 U/L (ref 15–37)
BASOPHILS # BLD: 0.1 K/UL (ref 0–0.1)
BASOPHILS NFR BLD: 1 % (ref 0–1)
BILIRUB SERPL-MCNC: 0.5 MG/DL (ref 0.2–1)
BNP SERPL-MCNC: 102 PG/ML (ref 0–450)
BUN SERPL-MCNC: 11 MG/DL (ref 6–20)
BUN/CREAT SERPL: 13 (ref 12–20)
CALCIUM SERPL-MCNC: 9.3 MG/DL (ref 8.5–10.1)
CHLORIDE SERPL-SCNC: 102 MMOL/L (ref 97–108)
CO2 SERPL-SCNC: 27 MMOL/L (ref 21–32)
COMMENT, HOLDF: NORMAL
CREAT SERPL-MCNC: 0.86 MG/DL (ref 0.55–1.02)
DIFFERENTIAL METHOD BLD: ABNORMAL
EOSINOPHIL # BLD: 0.2 K/UL (ref 0–0.4)
EOSINOPHIL NFR BLD: 2 % (ref 0–7)
ERYTHROCYTE [DISTWIDTH] IN BLOOD BY AUTOMATED COUNT: 14.9 % (ref 11.5–14.5)
GLOBULIN SER CALC-MCNC: 3.3 G/DL (ref 2–4)
GLUCOSE SERPL-MCNC: 134 MG/DL (ref 65–100)
HCT VFR BLD AUTO: 43.5 % (ref 35–47)
HGB BLD-MCNC: 14.1 G/DL (ref 11.5–16)
IMM GRANULOCYTES # BLD AUTO: 0 K/UL (ref 0–0.04)
IMM GRANULOCYTES NFR BLD AUTO: 0 % (ref 0–0.5)
INR PPP: 1.1 (ref 0.9–1.1)
LIPASE SERPL-CCNC: 128 U/L (ref 73–393)
LYMPHOCYTES # BLD: 2.4 K/UL (ref 0.8–3.5)
LYMPHOCYTES NFR BLD: 29 % (ref 12–49)
MAGNESIUM SERPL-MCNC: 1.9 MG/DL (ref 1.6–2.4)
MCH RBC QN AUTO: 28.8 PG (ref 26–34)
MCHC RBC AUTO-ENTMCNC: 32.4 G/DL (ref 30–36.5)
MCV RBC AUTO: 89 FL (ref 80–99)
MONOCYTES # BLD: 0.6 K/UL (ref 0–1)
MONOCYTES NFR BLD: 7 % (ref 5–13)
NEUTS SEG # BLD: 5.2 K/UL (ref 1.8–8)
NEUTS SEG NFR BLD: 61 % (ref 32–75)
NRBC # BLD: 0 K/UL (ref 0–0.01)
NRBC BLD-RTO: 0 PER 100 WBC
PLATELET # BLD AUTO: 255 K/UL (ref 150–400)
PMV BLD AUTO: 9 FL (ref 8.9–12.9)
POTASSIUM SERPL-SCNC: 4.4 MMOL/L (ref 3.5–5.1)
PROT SERPL-MCNC: 6.8 G/DL (ref 6.4–8.2)
PROTHROMBIN TIME: 10.6 SEC (ref 9–11.1)
RBC # BLD AUTO: 4.89 M/UL (ref 3.8–5.2)
SAMPLES BEING HELD,HOLD: NORMAL
SODIUM SERPL-SCNC: 139 MMOL/L (ref 136–145)
TROPONIN I SERPL-MCNC: <0.05 NG/ML
WBC # BLD AUTO: 8.4 K/UL (ref 3.6–11)

## 2021-03-23 PROCEDURE — 80053 COMPREHEN METABOLIC PANEL: CPT

## 2021-03-23 PROCEDURE — 85025 COMPLETE CBC W/AUTO DIFF WBC: CPT

## 2021-03-23 PROCEDURE — 83690 ASSAY OF LIPASE: CPT

## 2021-03-23 PROCEDURE — 96374 THER/PROPH/DIAG INJ IV PUSH: CPT

## 2021-03-23 PROCEDURE — 94761 N-INVAS EAR/PLS OXIMETRY MLT: CPT

## 2021-03-23 PROCEDURE — 96361 HYDRATE IV INFUSION ADD-ON: CPT

## 2021-03-23 PROCEDURE — 74011000636 HC RX REV CODE- 636: Performed by: EMERGENCY MEDICINE

## 2021-03-23 PROCEDURE — 74176 CT ABD & PELVIS W/O CONTRAST: CPT

## 2021-03-23 PROCEDURE — 74011250636 HC RX REV CODE- 250/636: Performed by: EMERGENCY MEDICINE

## 2021-03-23 PROCEDURE — 99284 EMERGENCY DEPT VISIT MOD MDM: CPT

## 2021-03-23 PROCEDURE — 85610 PROTHROMBIN TIME: CPT

## 2021-03-23 PROCEDURE — 36415 COLL VENOUS BLD VENIPUNCTURE: CPT

## 2021-03-23 PROCEDURE — 84484 ASSAY OF TROPONIN QUANT: CPT

## 2021-03-23 PROCEDURE — 83735 ASSAY OF MAGNESIUM: CPT

## 2021-03-23 PROCEDURE — 96375 TX/PRO/DX INJ NEW DRUG ADDON: CPT

## 2021-03-23 PROCEDURE — 83880 ASSAY OF NATRIURETIC PEPTIDE: CPT

## 2021-03-23 RX ORDER — MAGNESIUM CITRATE
SOLUTION, ORAL ORAL
Qty: 2 BOTTLE | Refills: 0 | Status: SHIPPED | OUTPATIENT
Start: 2021-03-23 | End: 2022-06-15

## 2021-03-23 RX ORDER — POLYETHYLENE GLYCOL 3350 17 G/17G
17 POWDER, FOR SOLUTION ORAL DAILY
Qty: 235 G | Refills: 0 | Status: SHIPPED | OUTPATIENT
Start: 2021-03-23 | End: 2022-06-15

## 2021-03-23 RX ORDER — MORPHINE SULFATE 4 MG/ML
4 INJECTION INTRAVENOUS ONCE
Status: DISCONTINUED | OUTPATIENT
Start: 2021-03-23 | End: 2021-03-23 | Stop reason: HOSPADM

## 2021-03-23 RX ORDER — ONDANSETRON 4 MG/1
4 TABLET, ORALLY DISINTEGRATING ORAL
Qty: 6 TAB | Refills: 0 | Status: SHIPPED | OUTPATIENT
Start: 2021-03-23 | End: 2021-06-25

## 2021-03-23 RX ORDER — ONDANSETRON 2 MG/ML
8 INJECTION INTRAMUSCULAR; INTRAVENOUS ONCE
Status: COMPLETED | OUTPATIENT
Start: 2021-03-23 | End: 2021-03-23

## 2021-03-23 RX ORDER — SODIUM CHLORIDE 0.9 % (FLUSH) 0.9 %
5-10 SYRINGE (ML) INJECTION ONCE
Status: DISCONTINUED | OUTPATIENT
Start: 2021-03-23 | End: 2021-03-23 | Stop reason: HOSPADM

## 2021-03-23 RX ORDER — DICYCLOMINE HYDROCHLORIDE 10 MG/1
10 CAPSULE ORAL
Qty: 12 CAP | Refills: 0 | Status: SHIPPED | OUTPATIENT
Start: 2021-03-23 | End: 2021-11-01 | Stop reason: SDUPTHER

## 2021-03-23 RX ADMIN — SODIUM CHLORIDE 1000 ML: 9 INJECTION, SOLUTION INTRAVENOUS at 10:02

## 2021-03-23 RX ADMIN — ONDANSETRON 8 MG: 2 INJECTION INTRAMUSCULAR; INTRAVENOUS at 10:02

## 2021-03-23 RX ADMIN — IOHEXOL 50 ML: 240 INJECTION, SOLUTION INTRATHECAL; INTRAVASCULAR; INTRAVENOUS; ORAL at 10:10

## 2021-03-23 NOTE — ED NOTES
Observed pt. Pt currently resting comfortably with call bell and bed rails up. Will continue to monitor as appropriate.

## 2021-03-23 NOTE — ED PROVIDER NOTES
EMERGENCY DEPARTMENT HISTORY AND PHYSICAL EXAM          Date: 3/23/2021  Patient Name: Barbara Adkins    History of Presenting Illness     Chief Complaint   Patient presents with    Abdominal Pain       History Provided By: Patient    HPI: Barbara Adkins is a [de-identified] y.o. female, pmhx high cholesterol, previous hepatic flexure cancer resection, chronic pain, anxiety, who presents via private automobile to the ED c/o abdominal pain    Approximately 1 week ago, patient was putting on her socks when she felt a cramp in the left side of her abdomen. Is it was pretty significant but did go away on its own. The next morning she felt burning in the left upper quadrant which has been fairly persistent since that time. Pain is moving slightly and now she feels pain under her ribs whenever she takes a deep breath. She denies any nausea or vomiting as well as any fevers or chills but she has had loose stools since her colon resection surgery which are intermittently black in color. She notes the last couple of days she thinks they have been more brown and denies any gross blood with bowel movements. Currently she is complaining of 2 out of 10 pain when she lays still but notes that it increases to 6 with deep breaths or movement. Patient denies any coughing, sore throat as well as any sick contacts but she does admit to some decreased appetite and possible weight loss. PCP: Lauren Alcala MD    Allergies: Multiple  Social Hx: +tobacco, -vaping, -EtOH, -Illicit Drugs; There are no other complaints, changes, or physical findings at this time. Current Outpatient Medications   Medication Sig Dispense Refill    magnesium citrate solution Drink entire bottle. If you have not had a large amount of stool output within an hour, drink the second bottle. 2 Bottle 0    polyethylene glycol (Miralax) 17 gram/dose powder Take 17 g by mouth daily.  1 tablespoon with 8 oz of water daily 235 g 0    dicyclomine (BENTYL) 10 mg capsule Take 1 Cap by mouth four (4) times daily as needed for Abdominal Cramps. 12 Cap 0    ondansetron (ZOFRAN ODT) 4 mg disintegrating tablet Take 1 Tab by mouth every eight (8) hours as needed for Nausea. 6 Tab 0    atorvastatin (LIPITOR) 40 mg tablet TAKE 1 TABLET BY MOUTH EVERY DAY FOR CHOLESTEROL 90 Tab 0    FLUoxetine (PROzac) 20 mg capsule TAKE 1 CAPSULE BY MOUTH EVERY DAY 90 Cap 0    metoprolol tartrate (LOPRESSOR) 50 mg tablet TAKE 1/2 TABLET BY MOUTH 2 TIMES A DAY FOR BLOOD PRESSURE 90 Tab 0    amLODIPine (NORVASC) 5 mg tablet TAKE 1 TABLET BY MOUTH EVERY DAY 90 Tab 0    FLUoxetine (PROzac) 10 mg capsule Take one capsule along with a 20 mg capsule daily 305 Cap 5    promethazine (PHENERGAN) 12.5 mg tablet TAKE 1 TABLET BY MOUTH EVERY 6 HOURS AS NEEDED FOR NAUSEA OR VOMITING FOR UP TO 7 DAYS 30 Tab 0    alendronate (FOSAMAX) 70 mg tablet Take 1 Tab by mouth every seven (7) days. Take in am with plain 8-12 oz plain water on empty stomach. Do not eat,drink, or lie down for 30 minutes after taking. 13 Tab 3    albuterol (ProAir HFA) 90 mcg/actuation inhaler INHALE 2 PUFFS BY MOUTH EVERY 4 HOURS AS NEEDED FOR COUGH/WHEEZE      traZODone (DESYREL) 50 mg tablet 1/2 tab at bedtime. May increase to 1 tab nightly (Patient taking differently: 1/4 tab at bedtime. May increase to 1 tab nightly) 45 Tab 1    albuterol (ACCUNEB) 1.25 mg/3 mL nebu Take 3 mL by inhalation every four (4) hours as needed for Wheezing. 25 Each 0    omeprazole (PRILOSEC OTC) 20 mg tablet Take 20 mg by mouth daily. Indications: gastroesophageal reflux disease      aspirin 81 mg tablet Take 81 mg by mouth daily (after lunch).            Past History     Past Medical History:  Past Medical History:   Diagnosis Date    Age-related osteoporosis without current pathological fracture 7/20/2020    Hips. 7/2020    Anxiety     panic attacks    Arthritis     Bilateral exudative age-related macular degeneration ([address] 12/20/2019    Cancer [facility]) 2012    hepatic flexure cancer; resected    Chronic pain     mid & upper back, shouldet    GERD (gastroesophageal reflux disease)     controlled with med    H/o Lyme disease 2015    Hematuria     Hypercholesterolemia     Hypertension     Pneumonia     bronchitis 2011         pneumonia twice in life    Status post colonoscopy with polypectomy        Past Surgical History:  Past Surgical History:   Procedure Laterality Date    COLONOSCOPY N/A 9/21/2016    COLONOSCOPY performed by Silvino Peña MD at [facility] AMBULATORY OR    HX GI      partial colectomy    HX OTHER SURGICAL      hemorrhoidectomy    HX TONSILLECTOMY      HX TUBAL LIGATION  1971       Family History:  Family History   Problem Relation Age of Onset    Heart Disease Mother     Kidney Disease Father     Seizures Father        Social History:  Social History     Tobacco Use    Smoking status: Current Every Day Smoker     Packs/day: 0.50     Years: 56.00     Pack years: 28.00    Smokeless tobacco: Never Used   Substance Use Topics    Alcohol use: No    Drug use: No       Allergies: Allergies   Allergen Reactions    Bactrim [Sulfamethoprim Ds] Other (comments)     Avoids due to mother's severe reaction- (has never had bactrim)    Penicillins Other (comments)     Severe headches    Sulfasalazine Drowsiness     Other reaction(s): Other (comments)  Avoids due to mother's severe reaction- (has never had bactrim)    Ciprofloxacin Nausea Only    Flagyl [Metronidazole] Other (comments)     Disoriented, unable to function normally         Review of Systems   Review of Systems   Constitutional: Positive for appetite change and fatigue. Negative for activity change, chills, fever and unexpected weight change. HENT: Negative for congestion. Eyes: Negative for pain and visual disturbance. Respiratory: Negative for cough and shortness of breath. Cardiovascular: Negative for chest pain.    Gastrointestinal: Positive for abdominal pain and diarrhea. Negative for nausea and vomiting. Genitourinary: Negative for dysuria. Musculoskeletal: Negative for back pain. Skin: Negative for rash. Neurological: Negative for headaches. Physical Exam   Physical Exam  Vitals signs and nursing note reviewed. Constitutional:       Appearance: She is well-developed. She is not diaphoretic. Comments: Morbidly obese elderly female who appears comfortable in bed, awake and alert with fairly normal vital signs in mild to moderate distress secondary to pain   HENT:      Head: Normocephalic and atraumatic. Eyes:      General:         Right eye: No discharge. Left eye: No discharge. Conjunctiva/sclera: Conjunctivae normal.      Pupils: Pupils are equal, round, and reactive to light. Neck:      Musculoskeletal: Normal range of motion and neck supple. Cardiovascular:      Rate and Rhythm: Normal rate and regular rhythm. Heart sounds: Normal heart sounds. No murmur. Pulmonary:      Effort: Pulmonary effort is normal. No respiratory distress. Breath sounds: Normal breath sounds. No wheezing or rales. Abdominal:      General: Abdomen is protuberant. Bowel sounds are normal. There is no distension or abdominal bruit. Palpations: Abdomen is soft. Tenderness: There is abdominal tenderness in the left upper quadrant and left lower quadrant. Negative signs include Justice's sign and McBurney's sign. Hernia: Hernia: No obvious hernia palpated. Musculoskeletal: Normal range of motion. Right lower leg: No edema. Left lower leg: No edema. Skin:     General: Skin is warm and dry. Findings: No rash. Neurological:      Mental Status: She is alert and oriented to person, place, and time. Cranial Nerves: No cranial nerve deficit. Motor: No abnormal muscle tone.        Diagnostic Study Results     Labs -     Recent Results (from the past 12 hour(s))   CBC WITH AUTOMATED DIFF Collection Time: 03/23/21  9:52 AM   Result Value Ref Range    WBC 8.4 3.6 - 11.0 K/uL    RBC 4.89 3.80 - 5.20 M/uL    HGB 14.1 11.5 - 16.0 g/dL    HCT 43.5 35.0 - 47.0 %    MCV 89.0 80.0 - 99.0 FL    MCH 28.8 26.0 - 34.0 PG    MCHC 32.4 30.0 - 36.5 g/dL    RDW 14.9 (H) 11.5 - 14.5 %    PLATELET 130 376 - 046 K/uL    MPV 9.0 8.9 - 12.9 FL    NRBC 0.0 0  WBC    ABSOLUTE NRBC 0.00 0.00 - 0.01 K/uL    NEUTROPHILS 61 32 - 75 %    LYMPHOCYTES 29 12 - 49 %    MONOCYTES 7 5 - 13 %    EOSINOPHILS 2 0 - 7 %    BASOPHILS 1 0 - 1 %    IMMATURE GRANULOCYTES 0 0.0 - 0.5 %    ABS. NEUTROPHILS 5.2 1.8 - 8.0 K/UL    ABS. LYMPHOCYTES 2.4 0.8 - 3.5 K/UL    ABS. MONOCYTES 0.6 0.0 - 1.0 K/UL    ABS. EOSINOPHILS 0.2 0.0 - 0.4 K/UL    ABS. BASOPHILS 0.1 0.0 - 0.1 K/UL    ABS. IMM. GRANS. 0.0 0.00 - 0.04 K/UL    DF AUTOMATED     PROTHROMBIN TIME + INR    Collection Time: 03/23/21  9:52 AM   Result Value Ref Range    INR 1.1 0.9 - 1.1      Prothrombin time 10.6 9.0 - 69.3 sec   METABOLIC PANEL, COMPREHENSIVE    Collection Time: 03/23/21  9:52 AM   Result Value Ref Range    Sodium 139 136 - 145 mmol/L    Potassium 4.4 3.5 - 5.1 mmol/L    Chloride 102 97 - 108 mmol/L    CO2 27 21 - 32 mmol/L    Anion gap 10 5 - 15 mmol/L    Glucose 134 (H) 65 - 100 mg/dL    BUN 11 6 - 20 MG/DL    Creatinine 0.86 0.55 - 1.02 MG/DL    BUN/Creatinine ratio 13 12 - 20      GFR est AA >60 >60 ml/min/1.73m2    GFR est non-AA >60 >60 ml/min/1.73m2    Calcium 9.3 8.5 - 10.1 MG/DL    Bilirubin, total 0.5 0.2 - 1.0 MG/DL    ALT (SGPT) 17 12 - 78 U/L    AST (SGOT) 13 (L) 15 - 37 U/L    Alk.  phosphatase 99 45 - 117 U/L    Protein, total 6.8 6.4 - 8.2 g/dL    Albumin 3.5 3.5 - 5.0 g/dL    Globulin 3.3 2.0 - 4.0 g/dL    A-G Ratio 1.1 1.1 - 2.2     LIPASE    Collection Time: 03/23/21  9:52 AM   Result Value Ref Range    Lipase 128 73 - 393 U/L   MAGNESIUM    Collection Time: 03/23/21  9:52 AM   Result Value Ref Range    Magnesium 1.9 1.6 - 2.4 mg/dL   TROPONIN I Collection Time: 03/23/21  9:52 AM   Result Value Ref Range    Troponin-I, Qt. <0.05 <0.05 ng/mL   NT-PRO BNP    Collection Time: 03/23/21  9:52 AM   Result Value Ref Range    NT pro- 0 - 450 PG/ML   SAMPLES BEING HELD    Collection Time: 03/23/21  9:52 AM   Result Value Ref Range    SAMPLES BEING HELD 1SST, 1RED     COMMENT        Add-on orders for these samples will be processed based on acceptable specimen integrity and analyte stability, which may vary by analyte. Radiologic Studies -   CT ABD PELV WO CONT   Final Result   1. Normal sized kidneys. Persistence of the previously described bilateral renal   cysts. No evidence of urolithiasis or hydronephrotic change. Findings suggestive   of the presence of a tiny hyperdense cyst involving the right kidney. 2. Continued evidence of cholelithiasis. 3. Normal appearance of the pancreas. 4. Normal sized spleen. CT Results  (Last 48 hours)               03/23/21 1206  CT ABD PELV WO CONT Final result    Impression:  1. Normal sized kidneys. Persistence of the previously described bilateral renal   cysts. No evidence of urolithiasis or hydronephrotic change. Findings suggestive   of the presence of a tiny hyperdense cyst involving the right kidney. 2. Continued evidence of cholelithiasis. 3. Normal appearance of the pancreas. 4. Normal sized spleen. Narrative:  EXAM: CT ABD PELV WO CONT       INDICATION: LUQ pain with SOB       COMPARISON: CT examination of the abdomen and pelvis dated 7/11/2019       CONTRAST:  None. TECHNIQUE:    Thin axial images were obtained through the abdomen and pelvis. Coronal and   sagittal reformats were generated. Oral contrast was administered. CT dose   reduction was achieved through use of a standardized protocol tailored for this   examination and automatic exposure control for dose modulation.         The absence of intravenous contrast material reduces the sensitivity for   evaluation of the vasculature and solid organs. FINDINGS: The images of the lung bases revealed no evidence of nodularity. The liver measures 16.4 cm longitudinally. No focal hepatic lesions are noted on   this noncontrasted examination. There continues to be evidence of   cholelithiasis. A few normal caliber, partially contrast-filled loops of bowel   are interposed between the anterior aspect of the liver and the abdominal wall. The pancreas is normal in appearance. The spleen measures 9.1 cm longitudinally. No focal splenic lesions are noted on this noncontrasted examination. The   kidneys are normal in size. The previously described multiple cystic lesions   involving the kidneys are again identified. The peripheral, curvilinear   calcification associated with the moderately large left renal cyst is again   noted. As seen on axial image 30, a very small (0.6 cm) focal radiopaque density   is associated with the cortex of the right kidney. This is suggestive of a   hyperdense cyst. There is no evidence of hydronephrotic change. The urinary   bladder is normal in appearance. A small, fat-containing inguinal hernia is   noted on the left. In retrospect, this was present at the time of the previous   examination and has not changed in appearance. No loops of bowel are noted   within this small hernia. The uterus is normal in appearance. A moderate amount   of gas and fecal material is noted within the colon. There is no evidence of   intestinal obstruction or free intraperitoneal air. CXR Results  (Last 48 hours)    None            Medical Decision Making   I am the first provider for this patient. I reviewed the vital signs, available nursing notes, past medical history, past surgical history, family history and social history. Vital Signs-Reviewed the patient's vital signs.   Patient Vitals for the past 12 hrs:   Temp Pulse Resp BP SpO2   03/23/21 1236  88  (!) 146/88 97 %   03/23/21 1100    135/81 98 %   03/23/21 1030    (!) 146/88 98 %   03/23/21 1000    139/85 98 %   03/23/21 0941 98.5 °F (36.9 °C) 94 18 (!) 153/92 98 %       Pulse Oximetry Analysis - 98% on RA    Records Reviewed: Nursing Notes, Old Medical Records, Previous Radiology Studies and Previous Laboratory Studies    Provider Notes (Medical Decision Making):   MDM: Elderly female presenting with abdominal pain and tenderness in the left side. She does not have any gross blood in stool evaluation. Heart rate is slightly elevated but respiratory rate, blood pressure and oxygenation are within normal limits and thus patient does not meet SIRS criteria. Monitor cardiac rhythm, vital signs closely while awaiting lab evaluation for infection. CT to be obtained to rule out diverticulitis, incarcerated hernia, small bowel obstruction. ED Course:   Initial assessment performed. The patients presenting problems have been discussed, and they are in agreement with the care plan formulated and outlined with them. I have encouraged them to ask questions as they arise throughout their visit. PROGRESS NOTE:    ED Course as of Mar 23 1656   Tue Mar 23, 2021   0959 Patient has made staff aware that she is \"allergic\" to contrast because it makes her feel hot and flushed. It was explained to her that she would need the contrast but she was told by Dr. Román Yarbrough that she does not ever have to have it again. Discussed with radiology and we will do oral contrast for the study. CBC reviewed with normal result. [JT]   1233 Doing well and feeling better. CT without any acute pathology and shows constipation. Discussed with patient and her daughter. Prescription sent to pharmacy. Return precautions reviewed. [JT]      ED Course User Index  [JT] Arely Michael., MD        Discharge note:  Pt re-evaluated and noted to be feeling better, ready for discharge. Updated pt on all final lab and imaging findings.   Will follow up as instructed. All questions have been answered, pt voiced understanding and agreement with plan. Specific return precautions provided as well as instructions to return to the ED should sx worsen at any time. Vital signs stable for discharge. Critical Care Time:   0      Diagnosis     Clinical Impression:   1. Constipation, unspecified constipation type    2. Abdominal pain, LUQ (left upper quadrant)        PLAN:  1. Discharge Medication List as of 3/23/2021 12:29 PM      START taking these medications    Details   magnesium citrate solution Drink entire bottle. If you have not had a large amount of stool output within an hour, drink the second bottle., Normal, Disp-2 Bottle, R-0      polyethylene glycol (Miralax) 17 gram/dose powder Take 17 g by mouth daily. 1 tablespoon with 8 oz of water daily, Normal, Disp-235 g, R-0      dicyclomine (BENTYL) 10 mg capsule Take 1 Cap by mouth four (4) times daily as needed for Abdominal Cramps., Normal, Disp-12 Cap, R-0         CONTINUE these medications which have NOT CHANGED    Details   atorvastatin (LIPITOR) 40 mg tablet TAKE 1 TABLET BY MOUTH EVERY DAY FOR CHOLESTEROL, Normal, Disp-90 Tab, R-0      !! FLUoxetine (PROzac) 20 mg capsule TAKE 1 CAPSULE BY MOUTH EVERY DAY, Normal, Disp-90 Cap, R-0      metoprolol tartrate (LOPRESSOR) 50 mg tablet TAKE 1/2 TABLET BY MOUTH 2 TIMES A DAY FOR BLOOD PRESSURE, Normal, Disp-90 Tab, R-0      amLODIPine (NORVASC) 5 mg tablet TAKE 1 TABLET BY MOUTH EVERY DAY, Normal, Disp-90 Tab, R-0      !! FLUoxetine (PROzac) 10 mg capsule Take one capsule along with a 20 mg capsule daily, Normal, Disp-305 Cap,R-5      promethazine (PHENERGAN) 12.5 mg tablet TAKE 1 TABLET BY MOUTH EVERY 6 HOURS AS NEEDED FOR NAUSEA OR VOMITING FOR UP TO 7 DAYS, Normal, Disp-30 Tab,R-0      alendronate (FOSAMAX) 70 mg tablet Take 1 Tab by mouth every seven (7) days. Take in am with plain 8-12 oz plain water on empty stomach.  Do not eat,drink, or lie down for 30 minutes after taking., Normal, Disp-13 Tab,R-3      albuterol (ProAir HFA) 90 mcg/actuation inhaler INHALE 2 PUFFS BY MOUTH EVERY 4 HOURS AS NEEDED FOR COUGH/WHEEZE, Historical Med      traZODone (DESYREL) 50 mg tablet 1/2 tab at bedtime. May increase to 1 tab nightly, Normal, Disp-45 Tab, R-1      albuterol (ACCUNEB) 1.25 mg/3 mL nebu Take 3 mL by inhalation every four (4) hours as needed for Wheezing., Normal, Disp-25 Each, R-0      omeprazole (PRILOSEC OTC) 20 mg tablet Take 20 mg by mouth daily. Indications: gastroesophageal reflux disease, Historical Med      aspirin 81 mg tablet Take 81 mg by mouth daily (after lunch). Historical Med, 81 mg       !! - Potential duplicate medications found. Please discuss with provider. 2.   Follow-up Information     Follow up With Specialties Details Why Contact Info    Lauren Alcala MD Family Medicine  As needed [address] [address]  [phone]      [address] [place] [address] Emergency Medicine  If symptoms worsen with fever, vomiting or increased pain [address]  [phone]        Return to ED if worse     Disposition:  Home       Please note, this dictation was completed with [email or web address], the computer voice recognition software. Quite often unanticipated grammatical, syntax, homophones, and other interpretive errors are inadvertently transcribed by the computer software. Please disregard these errors. Please excuse any errors that have escaped final proof reading.

## 2021-03-23 NOTE — ED NOTES
Patient educated on the medication(s) he/she is going to receive, allergies reviewed/verified and patient medicated as ordered. Side rails up and call light within reach. Will continue to monitor. Pt declined morphine at this time, pt educated that if her pain gets worse and she wants the pain medication to please let staff aware, pt vocalized understanding.

## 2021-03-23 NOTE — ED NOTES
1st contact with this patient:  Patient identified. Patient noted to have arrived by POV and requested a wheelchair. Pt reports lower abd pain X 1 week but reports the pain is worse today and on movement. Pt also complaints of left rib pain on inspiration and moving turning to the left. Pt reports history of colon cancer. Pt does also report + nausea and decreased appetite. Pt is awake alert and oriented x 4, speaking in full complete sentences  NAD. Pt educated on ER flow and provider at bedside.   Pt assisted into a gown

## 2021-03-29 ENCOUNTER — TRANSCRIBE ORDER (OUTPATIENT)
Dept: REGISTRATION | Age: 81
End: 2021-03-29

## 2021-03-29 ENCOUNTER — HOSPITAL ENCOUNTER (OUTPATIENT)
Dept: GENERAL RADIOLOGY | Age: 81
Discharge: HOME | End: 2021-03-29
Payer: MEDICARE

## 2021-03-29 DIAGNOSIS — G89.29 CHRONIC BILATERAL THORACIC BACK PAIN: ICD-10-CM

## 2021-03-29 DIAGNOSIS — M54.2 CERVICAL PAIN (NECK): ICD-10-CM

## 2021-03-29 DIAGNOSIS — M54.6 CHRONIC BILATERAL THORACIC BACK PAIN: ICD-10-CM

## 2021-03-29 DIAGNOSIS — M54.2 CERVICAL PAIN (NECK): Primary | ICD-10-CM

## 2021-03-29 PROCEDURE — 72070 X-RAY EXAM THORAC SPINE 2VWS: CPT

## 2021-03-29 PROCEDURE — 72040 X-RAY EXAM NECK SPINE 2-3 VW: CPT

## 2021-06-25 ENCOUNTER — VIRTUAL VISIT (OUTPATIENT)
Dept: FAMILY MEDICINE CLINIC | Age: 81
End: 2021-06-25
Payer: MEDICARE

## 2021-06-25 DIAGNOSIS — F32.A ANXIETY AND DEPRESSION: ICD-10-CM

## 2021-06-25 DIAGNOSIS — J01.90 ACUTE NON-RECURRENT SINUSITIS, UNSPECIFIED LOCATION: Primary | ICD-10-CM

## 2021-06-25 DIAGNOSIS — R19.5 LOOSE STOOLS: ICD-10-CM

## 2021-06-25 DIAGNOSIS — F51.01 PRIMARY INSOMNIA: ICD-10-CM

## 2021-06-25 DIAGNOSIS — F41.9 ANXIETY AND DEPRESSION: ICD-10-CM

## 2021-06-25 PROCEDURE — G2025 DIS SITE TELE SVCS RHC/FQHC: HCPCS | Performed by: PHYSICIAN ASSISTANT

## 2021-06-25 RX ORDER — HYDROXYZINE 25 MG/1
25 TABLET, FILM COATED ORAL
Qty: 30 TABLET | Refills: 0 | Status: SHIPPED | OUTPATIENT
Start: 2021-06-25 | End: 2022-06-15

## 2021-06-25 RX ORDER — AZITHROMYCIN 250 MG/1
TABLET, FILM COATED ORAL
Qty: 6 TABLET | Refills: 0 | Status: SHIPPED | OUTPATIENT
Start: 2021-06-25 | End: 2021-08-10 | Stop reason: ALTCHOICE

## 2021-06-25 NOTE — PROGRESS NOTES
Caterina Chavez is a [de-identified] y.o. female, evaluated via audio-only technology on 6/25/2021 for Sinus Infection (phone call only [phone]) and Diarrhea  . Assessment & Plan:   Diagnoses and all orders for this visit:    1. Acute non-recurrent sinusitis, unspecified location  -     azithromycin (ZITHROMAX) 250 mg tablet; Take 2 tablets today, then take 1 tablet daily    2. Anxiety and depression  -     hydrOXYzine HCL (ATARAX) 25 mg tablet; Take 1 Tablet by mouth nightly as needed for Anxiety. Indications: anxious    3. Primary insomnia  -     hydrOXYzine HCL (ATARAX) 25 mg tablet; Take 1 Tablet by mouth nightly as needed for Anxiety. Indications: anxious    4. Loose stools      Encourage rest & fluids. rec try a few days of conservative tx with things such as humidifier, warm compresses, nasal spray, decongestants prior to proceeding with emperic abx tx. Discussed otc medications for symptomatic relief as well. Will attempt prn hydroxyzine first for stress/ insomnia. Pt will not take other sleep aids in meantime. Counseled pt on potential medication AEs/interactions. Caution regarding sedation and safety. May start at half dose. Discussed dosing options moving fwd as well.  (pt is not taking nausea meds and reports no h/o QT or heart rhthym issues, still notified and told her she could try before or after abx tx). Recommended she try counseling for ongoing stress/anxiety. Pt will f/u with her PCP this week. She agrees to RTO/seek medical attn if sxs persist/worsen or develops any additional sxs/concerns. Instructed her to seek care over weekend if any sxs worsen. The complexity of medical decision making for this visit is moderate       Subjective:   Pt presents with \"A couple things going on\"  Thinks she has a sinus infection with HA, drainage, R ear pain x 1 week.   Did have eye drops from getting from her eye doctor and noticed the congestion after that so is not sure if its the drops or an infection. Tylenol has resolved the HA. Can push on her forehead and causes mucus to drain a \"creamy color\"  No sob/wheeze/cp/neck pain. Denies fever/chills, body aches. Did have tactile fever last night, but \"if anything low grade and does not feel that way now\"  Cannot tolerate PCN or Bactrim. Has done well z-pack in the past.    Also, feels her stress has become worse and cannot sleep. Worried about her son who is very sick. Is \"stressed to the limit\" and says \"its not anxiety its stress\" denies depression or other psych hx or sxs. Has been on fluoxetine for \"stress\" and tried trazodone but is exhausted all day the next day even if she takes a quarter tablet. Only other thing she tried was melatonin but gets \"horrible dreams. \"  Is taking fluoxetine 20 mg (has a 10 mg in her chart but is not taking that with) pt reports she did not like the way a higher dose of this made her feel, more like a zombie, but willing to try if recommended. Has not tried anything else for sleep. Is saying its just the stress really bothering her right now. Is not having any thoughts of wanting to harm herself or anyone. Only other med for Briseyda Montgomery" is xanax, and did not like the way it made her feel. Had 3-4 episodes of loose stool overnight. Is not sure if from the stress or something else. She has not had other stomach issues and denies nausea, vomiting,  sxs or abd pain. No other complaints at this time. Very focused on what is going on with her son. Prior to Admission medications    Medication Sig Start Date End Date Taking? Authorizing Provider   azithromycin (ZITHROMAX) 250 mg tablet Take 2 tablets today, then take 1 tablet daily 6/25/21  Yes Awa March PA-C   hydrOXYzine HCL (ATARAX) 25 mg tablet Take 1 Tablet by mouth nightly as needed for Anxiety.  Indications: anxious 6/25/21  Yes Awa March PA-C   metoprolol tartrate (LOPRESSOR) 50 mg tablet TAKE 1/2 TABLET BY MOUTH 2 TIMES A DAY FOR BLOOD PRESSURE 4/19/21  Yes Nichelle Donald MD   FLUoxetine (PROzac) 20 mg capsule TAKE 1 CAPSULE BY MOUTH EVERY DAY 4/19/21  Yes Nichelle Donald MD   magnesium citrate solution Drink entire bottle. If you have not had a large amount of stool output within an hour, drink the second bottle. 3/23/21  Yes Mery Kuhn MD   polyethylene glycol (Miralax) 17 gram/dose powder Take 17 g by mouth daily. 1 tablespoon with 8 oz of water daily 3/23/21  Yes Mery Kuhn MD   dicyclomine (BENTYL) 10 mg capsule Take 1 Cap by mouth four (4) times daily as needed for Abdominal Cramps. 3/23/21  Yes Mery Kuhn MD   atorvastatin (LIPITOR) 40 mg tablet TAKE 1 TABLET BY MOUTH EVERY DAY FOR CHOLESTEROL 1/22/21  Yes Nichelle Donald MD   amLODIPine (NORVASC) 5 mg tablet TAKE 1 TABLET BY MOUTH EVERY DAY 1/22/21  Yes Nichelle Donald MD   FLUoxetine (PROzac) 10 mg capsule Take one capsule along with a 20 mg capsule daily 11/17/20  Yes Nichelle Donald MD   alendronate (FOSAMAX) 70 mg tablet Take 1 Tab by mouth every seven (7) days. Take in am with plain 8-12 oz plain water on empty stomach. Do not eat,drink, or lie down for 30 minutes after taking. 7/20/20  Yes Nichelle Donald MD   albuterol (ProAir HFA) 90 mcg/actuation inhaler INHALE 2 PUFFS BY MOUTH EVERY 4 HOURS AS NEEDED FOR COUGH/WHEEZE 6/24/20  Yes ProviderSravani   traZODone (DESYREL) 50 mg tablet 1/2 tab at bedtime. May increase to 1 tab nightly  Patient taking differently: 1/4 tab at bedtime. May increase to 1 tab nightly 6/16/20  Yes Nichelle Donald MD   albuterol (ACCUNEB) 1.25 mg/3 mL nebu Take 3 mL by inhalation every four (4) hours as needed for Wheezing. 12/9/19  Yes Magaly Rincon MD   omeprazole (PRILOSEC OTC) 20 mg tablet Take 20 mg by mouth daily. Indications: gastroesophageal reflux disease   Yes Provider, Historical   aspirin 81 mg tablet Take 81 mg by mouth daily (after lunch).      Yes Provider, Historical Patient Active Problem List   Diagnosis Code    Cancer of transverse colon ([facility] C18.4    History of colon cancer, stage III Z85.038    Diverticulosis of sigmoid colon K57.30    Hypercholesterolemia E78.00    Hypertension I10    Encounter for colonoscopy due to history of colon cancer Z12.11, Z85.038    Bilateral exudative age-related macular degeneration ([facility] H35.3230    Anxiety and depression F41.9, F32.9    Age-related osteoporosis without current pathological fracture M81.0     Patient Active Problem List    Diagnosis Date Noted    Age-related osteoporosis without current pathological fracture 07/20/2020    Bilateral exudative age-related macular degeneration ([facility] 12/20/2019    Anxiety and depression 09/19/2017    Encounter for colonoscopy due to history of colon cancer 09/21/2016    Hypercholesterolemia     Hypertension     History of colon cancer, stage III 07/03/2013    Diverticulosis of sigmoid colon 07/03/2013    Cancer of transverse colon ([facility] 06/16/2012     Current Outpatient Medications   Medication Sig Dispense Refill    azithromycin (ZITHROMAX) 250 mg tablet Take 2 tablets today, then take 1 tablet daily 6 Tablet 0    hydrOXYzine HCL (ATARAX) 25 mg tablet Take 1 Tablet by mouth nightly as needed for Anxiety. Indications: anxious 30 Tablet 0    metoprolol tartrate (LOPRESSOR) 50 mg tablet TAKE 1/2 TABLET BY MOUTH 2 TIMES A DAY FOR BLOOD PRESSURE 90 Tab 1    FLUoxetine (PROzac) 20 mg capsule TAKE 1 CAPSULE BY MOUTH EVERY DAY 90 Cap 1    magnesium citrate solution Drink entire bottle. If you have not had a large amount of stool output within an hour, drink the second bottle. 2 Bottle 0    polyethylene glycol (Miralax) 17 gram/dose powder Take 17 g by mouth daily. 1 tablespoon with 8 oz of water daily 235 g 0    dicyclomine (BENTYL) 10 mg capsule Take 1 Cap by mouth four (4) times daily as needed for Abdominal Cramps.  12 Cap 0    atorvastatin (LIPITOR) 40 mg tablet TAKE 1 TABLET BY MOUTH EVERY DAY FOR CHOLESTEROL 90 Tab 0    amLODIPine (NORVASC) 5 mg tablet TAKE 1 TABLET BY MOUTH EVERY DAY 90 Tab 0    FLUoxetine (PROzac) 10 mg capsule Take one capsule along with a 20 mg capsule daily 305 Cap 5    alendronate (FOSAMAX) 70 mg tablet Take 1 Tab by mouth every seven (7) days. Take in am with plain 8-12 oz plain water on empty stomach. Do not eat,drink, or lie down for 30 minutes after taking. 13 Tab 3    albuterol (ProAir HFA) 90 mcg/actuation inhaler INHALE 2 PUFFS BY MOUTH EVERY 4 HOURS AS NEEDED FOR COUGH/WHEEZE      traZODone (DESYREL) 50 mg tablet 1/2 tab at bedtime. May increase to 1 tab nightly (Patient taking differently: 1/4 tab at bedtime. May increase to 1 tab nightly) 45 Tab 1    albuterol (ACCUNEB) 1.25 mg/3 mL nebu Take 3 mL by inhalation every four (4) hours as needed for Wheezing. 25 Each 0    omeprazole (PRILOSEC OTC) 20 mg tablet Take 20 mg by mouth daily. Indications: gastroesophageal reflux disease      aspirin 81 mg tablet Take 81 mg by mouth daily (after lunch). Allergies   Allergen Reactions    Bactrim [Sulfamethoprim Ds] Other (comments)     Avoids due to mother's severe reaction- (has never had bactrim)    Penicillins Other (comments)     Severe headches    Sulfasalazine Drowsiness     Other reaction(s):  Other (comments)  Avoids due to mother's severe reaction- (has never had bactrim)    Ciprofloxacin Nausea Only    Flagyl [Metronidazole] Other (comments)     Disoriented, unable to function normally     Past Medical History:   Diagnosis Date    Age-related osteoporosis without current pathological fracture 7/20/2020    Hips. 7/2020    Anxiety     panic attacks    Arthritis     Bilateral exudative age-related macular degeneration ([address] 12/20/2019    Cancer ([facility] 2012    hepatic flexure cancer; resected    Chronic pain     mid & upper back, shouldet    GERD (gastroesophageal reflux disease)     controlled with med    H/o Lyme disease 2015    Hematuria     Hypercholesterolemia     Hypertension     Pneumonia     bronchitis 2011         pneumonia twice in life    Status post colonoscopy with polypectomy      Past Surgical History:   Procedure Laterality Date    COLONOSCOPY N/A 9/21/2016    COLONOS[facility]ast [facility] at [facility] AMBULAT[facility]     GI      partial colectomy    HX OTHER SURGICAL      hemorrhoidectomy    HX TONSILLECTOMY      HX TUBAL LIGATION  1971     Family History   Problem Relation Age of Onset    Heart Disease Mother     Kidney Disease Father     Seizures Father      Social History     Tobacco Use    Smoking status: Current Every Day Smoker     Packs/day: 0.50     Years: 56.00     Pack years: 28.00    Smokeless tobacco: Never Used   Substance Use Topics    Alcohol use: No       Review of Systems   Gastrointestinal: Positive for diarrhea. Negative for abdominal pain, blood in stool, constipation, melena, nausea and vomiting. Musculoskeletal: Negative for myalgias and neck pain. Patient-Reported Vitals 11/17/2020   Patient-Reported Weight 182lb   Patient-Reported Height -   Patient-Reported Pulse -   Patient-Reported Systolic  948   Patient-Reported Diastolic 83        Abhijit Matos, who was evaluated through a patient-initiated, synchronous (real-time) audio only encounter, and/or her healthcare decision maker, is aware that it is a billable service, with coverage as determined by her insurance carrier. She provided verbal consent to proceed: Yes. She has not had a related appointment within my department in the past 7 days or scheduled within the next 24 hours.       Total Time: minutes: 11-20 minutes    Evaristo Victor PA-C

## 2021-06-25 NOTE — PATIENT INSTRUCTIONS
Sinusitis: Care Instructions  Your Care Instructions     Sinusitis is an infection of the lining of the sinus cavities in your head. Sinusitis often follows a cold. It causes pain and pressure in your head and face. In most cases, sinusitis gets better on its own in 1 to 2 weeks. But some mild symptoms may last for several weeks. Sometimes antibiotics are needed. Follow-up care is a key part of your treatment and safety. Be sure to make and go to all appointments, and call your doctor if you are having problems. It's also a good idea to know your test results and keep a list of the medicines you take. How can you care for yourself at home? · Take an over-the-counter pain medicine, such as acetaminophen (Tylenol), ibuprofen (Advil, Motrin), or naproxen (Aleve). Read and follow all instructions on the label. · If the doctor prescribed antibiotics, take them as directed. Do not stop taking them just because you feel better. You need to take the full course of antibiotics. · Be careful when taking over-the-counter cold or flu medicines and Tylenol at the same time. Many of these medicines have acetaminophen, which is Tylenol. Read the labels to make sure that you are not taking more than the recommended dose. Too much acetaminophen (Tylenol) can be harmful. · Breathe warm, moist air from a steamy shower, a hot bath, or a sink filled with hot water. Avoid cold, dry air. Using a humidifier in your home may help. Follow the directions for cleaning the machine. · Use saline (saltwater) nasal washes. This can help keep your nasal passages open and wash out mucus and bacteria. You can buy saline nose drops at a grocery store or drugstore. Or you can make your own at home by adding 1 teaspoon of salt and 1 teaspoon of baking soda to 2 cups of distilled water. If you make your own, fill a bulb syringe with the solution, insert the tip into your nostril, and squeeze gently. [place] your nose.   · Put a hot, wet towel or a warm gel pack on your face 3 or 4 times a day for 5 to 10 minutes each time. · Try a decongestant nasal spray like oxymetazoline (Afrin). Do not use it for more than 3 days in a row. Using it for more than 3 days can make your congestion worse. When should you call for help? Call your doctor now or seek immediate medical care if:    · You have new or worse swelling or redness in your face or around your eyes.     · You have a new or higher fever. Watch closely for changes in your health, and be sure to contact your doctor if:    · You have new or worse facial pain.     · The mucus from your nose becomes thicker (like pus) or has new blood in it.     · You are not getting better as expected. Where can you learn more? Go to http://www.gray.com/  Enter O018 in the search box to learn more about \"Sinusitis: Care Instructions. \"  Current as of: December 2, 2020               Content Version: 12.8  © 2006-2021 [employer]. Care instructions adapted under license by [employer] (which disclaims liability or warranty for this information). If you have questions about a medical condition or this instruction, always ask your healthcare professional. [place] [address] any warranty or liability for your use of this information.

## 2021-08-04 ENCOUNTER — TELEPHONE (OUTPATIENT)
Dept: FAMILY MEDICINE CLINIC | Age: 81
End: 2021-08-04

## 2021-08-04 NOTE — TELEPHONE ENCOUNTER
Patient asking for return call from nurse or Shaylee Wade. States she has multiple issues she would like to discuss. Muscle spasms, sinuses, she hurts all over and she has had diarrhea for the past 3 days.  Please call her back at [phone]

## 2021-08-10 ENCOUNTER — VIRTUAL VISIT (OUTPATIENT)
Dept: FAMILY MEDICINE CLINIC | Age: 81
End: 2021-08-10
Payer: MEDICARE

## 2021-08-10 DIAGNOSIS — M79.605 LUMBAR PAIN WITH RADIATION DOWN LEFT LEG: ICD-10-CM

## 2021-08-10 DIAGNOSIS — M25.561 ACUTE PAIN OF RIGHT KNEE: Primary | ICD-10-CM

## 2021-08-10 DIAGNOSIS — M54.50 LUMBAR PAIN WITH RADIATION DOWN LEFT LEG: ICD-10-CM

## 2021-08-10 PROCEDURE — G2025 DIS SITE TELE SVCS RHC/FQHC: HCPCS | Performed by: FAMILY MEDICINE

## 2021-08-10 RX ORDER — CYCLOBENZAPRINE HCL 10 MG
10 TABLET ORAL
Qty: 21 TABLET | Refills: 0 | Status: SHIPPED | OUTPATIENT
Start: 2021-08-10 | End: 2021-11-01 | Stop reason: ALTCHOICE

## 2021-08-10 NOTE — PROGRESS NOTES
Sterling Woods is a [de-identified] y.o. female, evaluated via audio-only technology on 8/10/2021 for Back Pain ([phone]), Pressure Behind the Eyes, Ear Pain (right ), and Knee Pain (right )  . Assessment & Plan:   Diagnoses and all orders for this visit:    1. Acute pain of right knee    2. Lumbar pain with radiation down left leg  -     cyclobenzaprine (FLEXERIL) 10 mg tablet; Take 1 Tablet by mouth three (3) times daily as needed for Muscle Spasm(s). Symptomatic rx. Return for in person assessment if not improving next 2-3 weeks  12  Subjective:     One week of lumbar back pain with radiation down L leg. Son  recently and developed pain and acute R knee pain and swelling after prolonged standing and walking. Knee feels like it could give way. Both sources of pain a little better last two days. APAP helps. Prior to Admission medications    Medication Sig Start Date End Date Taking? Authorizing Provider   metoprolol tartrate (LOPRESSOR) 50 mg tablet TAKE 1/2 TABLET BY MOUTH 2 TIMES A DAY FOR BLOOD PRESSURE 21  Yes Trey Grant MD   FLUoxetine (PROzac) 20 mg capsule TAKE 1 CAPSULE BY MOUTH EVERY DAY 21  Yes Trey Grant MD   atorvastatin (LIPITOR) 40 mg tablet TAKE 1 TABLET BY MOUTH EVERY DAY FOR CHOLESTEROL 21  Yes Trey Grant MD   amLODIPine (NORVASC) 5 mg tablet TAKE 1 TABLET BY MOUTH EVERY DAY 21  Yes Trey Grant MD   traZODone (DESYREL) 50 mg tablet 1/2 tab at bedtime. May increase to 1 tab nightly  Patient taking differently: 1/4 tab at bedtime. May increase to 1 tab nightly 20  Yes Trey Grant MD   omeprazole (PRILOSEC OTC) 20 mg tablet Take 20 mg by mouth daily. Indications: gastroesophageal reflux disease   Yes Provider, Historical   aspirin 81 mg tablet Take 81 mg by mouth daily (after lunch). Yes Provider, Historical   hydrOXYzine HCL (ATARAX) 25 mg tablet Take 1 Tablet by mouth nightly as needed for Anxiety.  Indications: anxious  Patient not taking: Reported on 8/10/2021 6/25/21   Krala Motta PA-C   magnesium citrate solution Drink entire bottle. If you have not had a large amount of stool output within an hour, drink the second bottle. Patient not taking: Reported on 8/10/2021 3/23/21   Cricket Kuhn MD   polyethylene glycol (Miralax) 17 gram/dose powder Take 17 g by mouth daily. 1 tablespoon with 8 oz of water daily  Patient not taking: Reported on 8/10/2021 3/23/21   Cricket Kuhn MD   dicyclomine (BENTYL) 10 mg capsule Take 1 Cap by mouth four (4) times daily as needed for Abdominal Cramps. Patient not taking: Reported on 8/10/2021 3/23/21   Cricket Kuhn MD   FLUoxetine (PROzac) 10 mg capsule Take one capsule along with a 20 mg capsule daily  Patient not taking: Reported on 8/10/2021 11/17/20   Kirk Draper MD   alendronate (FOSAMAX) 70 mg tablet Take 1 Tab by mouth every seven (7) days. Take in am with plain 8-12 oz plain water on empty stomach. Do not eat,drink, or lie down for 30 minutes after taking. Patient not taking: Reported on 8/10/2021 7/20/20   Kirk Draper MD   albuterol (ProAir HFA) 90 mcg/actuation inhaler INHALE 2 PUFFS BY MOUTH EVERY 4 HOURS AS NEEDED FOR COUGH/WHEEZE  Patient not taking: Reported on 8/10/2021 6/24/20   Provider, Historical   albuterol (ACCUNEB) 1.25 mg/3 mL nebu Take 3 mL by inhalation every four (4) hours as needed for Wheezing.   Patient not taking: Reported on 8/10/2021 12/9/19   Cristy Candelaria MD     Patient Active Problem List   Diagnosis Code    Cancer of transverse colon ([facility] C18.4    History of colon cancer, stage III Z85.038    Diverticulosis of sigmoid colon K57.30    Hypercholesterolemia E78.00    Hypertension I10    Encounter for colonoscopy due to history of colon cancer Z12.11, Z85.038    Bilateral exudative age-related macular degeneration ([address] H35.3230    Anxiety and depression F41.9, F32.9    Age-related osteoporosis without current pathological fracture M81.0     Current Outpatient Medications   Medication Sig Dispense Refill    cyclobenzaprine (FLEXERIL) 10 mg tablet Take 1 Tablet by mouth three (3) times daily as needed for Muscle Spasm(s). 21 Tablet 0    metoprolol tartrate (LOPRESSOR) 50 mg tablet TAKE 1/2 TABLET BY MOUTH 2 TIMES A DAY FOR BLOOD PRESSURE 90 Tab 1    FLUoxetine (PROzac) 20 mg capsule TAKE 1 CAPSULE BY MOUTH EVERY DAY 90 Cap 1    atorvastatin (LIPITOR) 40 mg tablet TAKE 1 TABLET BY MOUTH EVERY DAY FOR CHOLESTEROL 90 Tab 0    amLODIPine (NORVASC) 5 mg tablet TAKE 1 TABLET BY MOUTH EVERY DAY 90 Tab 0    traZODone (DESYREL) 50 mg tablet 1/2 tab at bedtime. May increase to 1 tab nightly (Patient taking differently: 1/4 tab at bedtime. May increase to 1 tab nightly) 45 Tab 1    omeprazole (PRILOSEC OTC) 20 mg tablet Take 20 mg by mouth daily. Indications: gastroesophageal reflux disease      aspirin 81 mg tablet Take 81 mg by mouth daily (after lunch).  hydrOXYzine HCL (ATARAX) 25 mg tablet Take 1 Tablet by mouth nightly as needed for Anxiety. Indications: anxious (Patient not taking: Reported on 8/10/2021) 30 Tablet 0    magnesium citrate solution Drink entire bottle. If you have not had a large amount of stool output within an hour, drink the second bottle. (Patient not taking: Reported on 8/10/2021) 2 Bottle 0    polyethylene glycol (Miralax) 17 gram/dose powder Take 17 g by mouth daily. 1 tablespoon with 8 oz of water daily (Patient not taking: Reported on 8/10/2021) 235 g 0    dicyclomine (BENTYL) 10 mg capsule Take 1 Cap by mouth four (4) times daily as needed for Abdominal Cramps. (Patient not taking: Reported on 8/10/2021) 12 Cap 0    FLUoxetine (PROzac) 10 mg capsule Take one capsule along with a 20 mg capsule daily (Patient not taking: Reported on 8/10/2021) 305 Cap 5    alendronate (FOSAMAX) 70 mg tablet Take 1 Tab by mouth every seven (7) days.  Take in am with plain 8-12 oz plain water on empty stomach. Do not eat,drink, or lie down for 30 minutes after taking. (Patient not taking: Reported on 8/10/2021) 13 Tab 3    albuterol (ProAir HFA) 90 mcg/actuation inhaler INHALE 2 PUFFS BY MOUTH EVERY 4 HOURS AS NEEDED FOR COUGH/WHEEZE (Patient not taking: Reported on 8/10/2021)      albuterol (ACCUNEB) 1.25 mg/3 mL nebu Take 3 mL by inhalation every four (4) hours as needed for Wheezing. (Patient not taking: Reported on 8/10/2021) 25 Each 0     Allergies   Allergen Reactions    Bactrim [Sulfamethoprim Ds] Other (comments)     Avoids due to mother's severe reaction- (has never had bactrim)    Penicillins Other (comments)     Severe headches    Sulfasalazine Drowsiness     Other reaction(s): Other (comments)  Avoids due to mother's severe reaction- (has never had bactrim)    Ciprofloxacin Nausea Only    Flagyl [Metronidazole] Other (comments)     Disoriented, unable to function normally       ROS    Patient-Reported Vitals 11/17/2020   Patient-Reported Weight 182lb   Patient-Reported Height -   Patient-Reported Pulse -   Patient-Reported Systolic  295   Patient-Reported Diastolic 83        Beatrice Linda, who was evaluated through a patient-initiated, synchronous (real-time) audio only encounter, and/or her healthcare decision maker, is aware that it is a billable service, with coverage as determined by her insurance carrier. She provided verbal consent to proceed: Yes. She has not had a related appointment within my department in the past 7 days or scheduled within the next 24 hours. Total Time: minutes: 11-20 minutes    Alivia Mckeon MD Back pain 7/10     1. Have you been to the ER, urgent care clinic since your last visit? Hospitalized since your last visit? No    2. Have you seen or consulted any other health care providers outside of the [address] [place] [address] since your last visit? Include any pap smears or colon screening.  No

## 2021-10-04 ENCOUNTER — VIRTUAL VISIT (OUTPATIENT)
Dept: FAMILY MEDICINE CLINIC | Age: 81
End: 2021-10-04
Payer: MEDICARE

## 2021-10-04 DIAGNOSIS — J42 CHRONIC BRONCHITIS, UNSPECIFIED CHRONIC BRONCHITIS TYPE (HCC): Primary | ICD-10-CM

## 2021-10-04 PROCEDURE — 99213 OFFICE O/P EST LOW 20 MIN: CPT | Performed by: FAMILY MEDICINE

## 2021-10-04 RX ORDER — AZITHROMYCIN 250 MG/1
TABLET, FILM COATED ORAL
Qty: 6 TABLET | Refills: 0 | Status: SHIPPED | OUTPATIENT
Start: 2021-10-04 | End: 2021-10-09

## 2021-10-04 RX ORDER — ALBUTEROL SULFATE 1.25 MG/3ML
1.25 SOLUTION RESPIRATORY (INHALATION)
Qty: 25 EACH | Refills: 5 | Status: SHIPPED | OUTPATIENT
Start: 2021-10-04
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
## 2021-11-01 ENCOUNTER — OFFICE VISIT (OUTPATIENT)
Dept: FAMILY MEDICINE CLINIC | Age: 81
End: 2021-11-01
Payer: MEDICARE

## 2021-11-01 VITALS
WEIGHT: 175.38 LBS | TEMPERATURE: 97.3 F | RESPIRATION RATE: 18 BRPM | OXYGEN SATURATION: 96 % | DIASTOLIC BLOOD PRESSURE: 78 MMHG | HEART RATE: 81 BPM | HEIGHT: 64 IN | SYSTOLIC BLOOD PRESSURE: 117 MMHG | BODY MASS INDEX: 29.94 KG/M2

## 2021-11-01 DIAGNOSIS — E78.00 HYPERCHOLESTEROLEMIA: Primary | ICD-10-CM

## 2021-11-01 DIAGNOSIS — Z23 ENCOUNTER FOR IMMUNIZATION: ICD-10-CM

## 2021-11-01 DIAGNOSIS — Z00.00 MEDICARE ANNUAL WELLNESS VISIT, SUBSEQUENT: ICD-10-CM

## 2021-11-01 DIAGNOSIS — E55.9 VITAMIN D INSUFFICIENCY: ICD-10-CM

## 2021-11-01 DIAGNOSIS — H35.3230 BILATERAL EXUDATIVE AGE-RELATED MACULAR DEGENERATION, UNSPECIFIED STAGE (HCC): ICD-10-CM

## 2021-11-01 DIAGNOSIS — C18.4 CANCER OF TRANSVERSE COLON (HCC): ICD-10-CM

## 2021-11-01 PROCEDURE — G0008 ADMIN INFLUENZA VIRUS VAC: HCPCS | Performed by: FAMILY MEDICINE

## 2021-11-01 PROCEDURE — 99213 OFFICE O/P EST LOW 20 MIN: CPT | Performed by: FAMILY MEDICINE

## 2021-11-01 PROCEDURE — G0439 PPPS, SUBSEQ VISIT: HCPCS | Performed by: FAMILY MEDICINE

## 2021-11-01 PROCEDURE — 90694 VACC AIIV4 NO PRSRV 0.5ML IM: CPT | Performed by: FAMILY MEDICINE

## 2021-11-01 NOTE — PROGRESS NOTES
1. Have you been to the ER, urgent care clinic since your last visit? Hospitalized since your last visit? No    2. Have you seen or consulted any other health care providers outside of the [address] [place] [address] [place] since your last visit? Include any pap smears or colon screening. No       Functional Ability:   Does the patient exhibit a steady gait? yes   How long did it take the patient to get up and walk from a sitting position? 10 seconds   Is the patient self reliant?  (ie can do own laundry, meals, household chores)  yes     Does the patient handle his/her own medications? yes     Does the patient handle his/her own money? yes     Is the patients home safe (ie good lighting, handrails on stairs and bath, etc.)? yes     Did you notice or did patient express any hearing difficulties? no     Did you notice or did patient express any vision difficulties?   no     Were distance and reading eye charts used? no       Advance Care Planning:   Patient was offered the opportunity to discuss advance care planning:  yes     Does patient have an Advance Directive:  yes   If no, did you provide information on Caring Connections? yes     ADL Assessment 6/25/2021   Feeding yourself No Help Needed   Getting from bed to chair No Help Needed   Getting dressed No Help Needed   Bathing or showering No Help Needed   Walk across the room (includes cane/walker) No Help Needed   Using the telphone No Help Needed   Taking your medications No Help Needed   Preparing meals No Help Needed   Managing money (expenses/bills) No Help Needed   Moderately strenuous housework (laundry) No Help Needed   Shopping for personal items (toiletries/medicines) No Help Needed   Shopping for groceries No Help Needed   Driving No Help Needed   Climbing a flight of stairs No Help Needed   Getting to places beyond walking distances No Help Needed       Abuse Screening Questionnaire 6/25/2021   Do you ever feel afraid of your partner?  N   Are you in a relationship with someone who physically or mentally threatens you? N   Is it safe for you to go home? Y       This is the Subsequent Medicare Annual Wellness Exam, performed 12 months or more after the Initial AWV or the last Subsequent AWV    I have reviewed the patient's medical history in detail and updated the computerized patient record. Assessment/Plan   Education and counseling provided:  Are appropriate based on today's review and evaluation    1. Hypercholesterolemia  -     METABOLIC PANEL, COMPREHENSIVE; Future  -     LIPID PANEL; Future  2. Vitamin D insufficiency  -     VITAMIN D, 25 HYDROXY; Future  -     METABOLIC PANEL, COMPREHENSIVE; Future  3. Bilateral exudative age-related macular degeneration, unspecified stage ([facility]  4. Cancer of transverse colon ([facility]  5.  Medicare annual wellness visit, subsequent       Depression Risk Factor Screening     3 most recent PHQ Screens 11/1/2021   PHQ Not Done -   Little interest or pleasure in doing things Not at all   Feeling down, depressed, irritable, or hopeless Not at all   Total Score PHQ 2 0   Trouble falling or staying asleep, or sleeping too much -   Feeling tired or having little energy -   Poor appetite, weight loss, or overeating -   Feeling bad about yourself - or that you are a failure or have let yourself or your family down -   Trouble concentrating on things such as school, work, reading, or watching TV -   Moving or speaking so slowly that other people could have noticed; or the opposite being so fidgety that others notice -   Thoughts of being better off dead, or hurting yourself in some way -   PHQ 9 Score -   How difficult have these problems made it for you to do your work, take care of your home and get along with others -       Alcohol Risk Screen    Do you average more than 1 drink per night or more than 7 drinks a week:  No    On any one occasion in the past three months have you have had more than 3 drinks containing alcohol: No        Functional Ability and Level of Safety    Hearing: Hearing is good. Activities of Daily Living: The home contains: no safety equipment. Patient does total self care      Ambulation: with no difficulty     Fall Risk:  Fall Risk Assessment, last 12 mths 6/25/2021   Able to walk? Yes   Fall in past 12 months? 0   Do you feel unsteady? 0   Are you worried about falling 0   Number of falls in past 12 months -   Fall with injury? -      Abuse Screen:  Patient is not abused       Cognitive Screening    Has your family/caregiver stated any concerns about your memory: no     Cognitive Screening: Normal - Clock Drawing Test    Health Maintenance Due     Health Maintenance Due   Topic Date Due    DTaP/Tdap/Td series (1 - Tdap) Never done    Shingrix Vaccine Age 50> (1 of 2) Never done    Flu Vaccine (1) 09/01/2021       Patient Care Team   Patient Care Team:  Jennifer Dobbs MD as PCP - General (Family Medicine)  Rand Ayon MD (Family Medicine)    History     Patient Active Problem List   Diagnosis Code    Cancer of transverse colon ([facility] C18.4    History of colon cancer, stage III Z85.038    Diverticulosis of sigmoid colon K57.30    Hypercholesterolemia E78.00    Hypertension I10    Encounter for colonoscopy due to history of colon cancer Z12.11, Z85.038    Bilateral exudative age-related macular degeneration ([address] H35.3230    Anxiety and depression F41.9, F32. A    Age-related osteoporosis without current pathological fracture M81.0     Past Medical History:   Diagnosis Date    Age-related osteoporosis without current pathological fracture 7/20/2020    Hips. 7/2020    Anxiety     panic attacks    Arthritis     Bilateral exudative age-related macular degeneration ([address] 12/20/2019    Cancer ([facility] 2012    hepatic flexure cancer; resected    Chronic pain     mid & upper back, shouldet    GERD (gastroesophageal reflux disease)     controlled with med    H/o Lyme disease 2015  Hematuria     Hypercholesterolemia     Hypertension     Pneumonia     bronchitis 2011         pneumonia twice in life    Status post colonoscopy with polypectomy       Past Surgical History:   Procedure Laterality Date    COLONOSCOPY N/A 9/21/2016    COLONOSCOPY performed by Meryle Billings, [facility] at [facility] AMBULATORY [facility]     GI      partial colectomy    HX OTHER SURGICAL      hemorrhoidectomy    HX TONSILLECTOMY      HX TUBAL LIGATION  1971     Current Outpatient Medications   Medication Sig Dispense Refill    FLUoxetine (PROzac) 20 mg capsule TAKE 1 CAPSULE BY MOUTH EVERY DAY 90 Capsule 1    metoprolol tartrate (LOPRESSOR) 50 mg tablet TAKE 1/2 TABLET BY MOUTH 2 TIMES A DAY FOR BLOOD PRESSURE 90 Tablet 0    albuterol (ACCUNEB) 1.25 mg/3 mL nebu Take 3 mL by inhalation every four (4) hours as needed for Wheezing. 25 Each 5    atorvastatin (LIPITOR) 40 mg tablet TAKE 1 TABLET BY MOUTH EVERY DAY FOR CHOLESTEROL 90 Tab 0    amLODIPine (NORVASC) 5 mg tablet TAKE 1 TABLET BY MOUTH EVERY DAY 90 Tab 0    albuterol (ProAir HFA) 90 mcg/actuation inhaler INHALE 2 PUFFS BY MOUTH EVERY 4 HOURS AS NEEDED FOR COUGH/WHEEZE      omeprazole (PRILOSEC OTC) 20 mg tablet Take 20 mg by mouth daily. Indications: gastroesophageal reflux disease      aspirin 81 mg tablet Take 81 mg by mouth daily (after lunch).  cyclobenzaprine (FLEXERIL) 10 mg tablet Take 1 Tablet by mouth three (3) times daily as needed for Muscle Spasm(s). (Patient not taking: Reported on 10/4/2021) 21 Tablet 0    hydrOXYzine HCL (ATARAX) 25 mg tablet Take 1 Tablet by mouth nightly as needed for Anxiety. Indications: anxious (Patient not taking: Reported on 8/10/2021) 30 Tablet 0    magnesium citrate solution Drink entire bottle. If you have not had a large amount of stool output within an hour, drink the second bottle.  (Patient not taking: Reported on 8/10/2021) 2 Bottle 0    polyethylene glycol (Miralax) 17 gram/dose powder Take 17 g by mouth daily. 1 tablespoon with 8 oz of water daily (Patient not taking: Reported on 8/10/2021) 235 g 0    dicyclomine (BENTYL) 10 mg capsule Take 1 Cap by mouth four (4) times daily as needed for Abdominal Cramps. (Patient not taking: Reported on 8/10/2021) 12 Cap 0    FLUoxetine (PROzac) 10 mg capsule Take one capsule along with a 20 mg capsule daily (Patient not taking: Reported on 11/1/2021) 305 Cap 5    alendronate (FOSAMAX) 70 mg tablet Take 1 Tab by mouth every seven (7) days. Take in am with plain 8-12 oz plain water on empty stomach. Do not eat,drink, or lie down for 30 minutes after taking. (Patient not taking: Reported on 8/10/2021) 13 Tab 3    traZODone (DESYREL) 50 mg tablet 1/2 tab at bedtime. May increase to 1 tab nightly (Patient not taking: Reported on 10/4/2021) 45 Tab 1     Allergies   Allergen Reactions    Bactrim [Sulfamethoprim Ds] Other (comments)     Avoids due to mother's severe reaction- (has never had bactrim)    Penicillins Other (comments)     Severe headches    Sulfasalazine Drowsiness     Other reaction(s):  Other (comments)  Avoids due to mother's severe reaction- (has never had bactrim)    Ciprofloxacin Nausea Only    Flagyl [Metronidazole] Other (comments)     Disoriented, unable to function normally       Family History   Problem Relation Age of Onset    Heart Disease Mother     Kidney Disease Father     Seizures Father      Social History     Tobacco Use    Smoking status: Current Every Day Smoker     Packs/day: 0.50     Years: 56.00     Pack years: 28.00    Smokeless tobacco: Never Used   Substance Use Topics    Alcohol use: No         Sexton End, LPN

## 2021-11-01 NOTE — PROGRESS NOTES
11/1/2021      Chief Complaint   Patient presents with    Annual Wellness Visit         Pressure Behind the Eyes         History of Present Illness:        Brandy Mederos is a 80 y.o. female feels well, mood is good. Has trouble remembering alendronate. Knees and R shoulder give her some pain at time.s      Allergies   Allergen Reactions    Bactrim [Sulfamethoprim Ds] Other (comments)     Avoids due to mother's severe reaction- (has never had bactrim)    Penicillins Other (comments)     Severe headches    Sulfasalazine Drowsiness     Other reaction(s): Other (comments)  Avoids due to mother's severe reaction- (has never had bactrim)    Ciprofloxacin Nausea Only    Flagyl [Metronidazole] Other (comments)     Disoriented, unable to function normally       Current Outpatient Medications   Medication Sig    FLUoxetine (PROzac) 20 mg capsule TAKE 1 CAPSULE BY MOUTH EVERY DAY    metoprolol tartrate (LOPRESSOR) 50 mg tablet TAKE 1/2 TABLET BY MOUTH 2 TIMES A DAY FOR BLOOD PRESSURE    albuterol (ACCUNEB) 1.25 mg/3 mL nebu Take 3 mL by inhalation every four (4) hours as needed for Wheezing.  atorvastatin (LIPITOR) 40 mg tablet TAKE 1 TABLET BY MOUTH EVERY DAY FOR CHOLESTEROL    amLODIPine (NORVASC) 5 mg tablet TAKE 1 TABLET BY MOUTH EVERY DAY    alendronate (FOSAMAX) 70 mg tablet Take 1 Tab by mouth every seven (7) days. Take in am with plain 8-12 oz plain water on empty stomach. Do not eat,drink, or lie down for 30 minutes after taking.  albuterol (ProAir HFA) 90 mcg/actuation inhaler INHALE 2 PUFFS BY MOUTH EVERY 4 HOURS AS NEEDED FOR COUGH/WHEEZE    traZODone (DESYREL) 50 mg tablet 1/2 tab at bedtime. May increase to 1 tab nightly    omeprazole (PRILOSEC OTC) 20 mg tablet Take 20 mg by mouth daily. Indications: gastroesophageal reflux disease    aspirin 81 mg tablet Take 81 mg by mouth daily (after lunch).       hydrOXYzine HCL (ATARAX) 25 mg tablet Take 1 Tablet by mouth nightly as needed for Anxiety. Indications: anxious (Patient not taking: Reported on 8/10/2021)    magnesium citrate solution Drink entire bottle. If you have not had a large amount of stool output within an hour, drink the second bottle. (Patient not taking: Reported on 8/10/2021)    polyethylene glycol (Miralax) 17 gram/dose powder Take 17 g by mouth daily. 1 tablespoon with 8 oz of water daily (Patient not taking: Reported on 8/10/2021)     No current facility-administered medications for this visit. Physical Examination:    Visit Vitals  /78 (BP 1 Location: Left upper arm, BP Patient Position: Sitting, BP Cuff Size: Adult)   Pulse 81   Temp 97.3 °F (36.3 °C) (Oral)   Resp 18   Ht 5' 4\" (1.626 m)   Wt 175 lb 6 oz (79.5 kg)   SpO2 96%   BMI 30.10 kg/m²      General:  Alert, cooperative, no distress. HEENT:  Normocephalic, without obvious abnormality, atraumatic. Conjunctivae/corneas clear. Pupils equal, round, reactive to light. Extraocular movements intact. TMs and external canals normal bilaterally. Nasal mucosa and oropharynx clear. Lungs: Clear to auscultation bilaterally. Chest wall:  No tenderness or deformity. Heart:  Regular rate and rhythm, S1, S2 normal, no murmur, click, rub, or gallop. Abdomen:   Soft, non-tender. Bowel sounds normal. No masses. No organomegaly. Extremities: Extremities normal, atraumatic, no cyanosis or edema. Pulses: 2+ and symmetric all extremities. Skin: Skin color, texture, turgor normal. No rashes or lesions. Lymph nodes: Cervical, supraclavicular, and axillary nodes normal.   Neurologic: CNII-XII intact. Normal strength, sensation, and reflexes throughout. ASSESSMENT AND PLAN    1. Hypercholesterolemia    - METABOLIC PANEL, COMPREHENSIVE; Future  - LIPID PANEL; Future    2. Vitamin D insufficiency  Try and remember to take alendronate  - VITAMIN D, 25 HYDROXY; Future  - METABOLIC PANEL, COMPREHENSIVE; Future    3.  Bilateral exudative age-related macular degeneration, unspecified stage (HCC)  Stable    4. Cancer of transverse colon ([address]  Resected for cure    5. Medicare annual wellness visit, subsequent      6.  Encounter for immunization    - FLU (FLUAD QUAD INFLUENZA VACCINE,QUAD,ADJUVANTED)            Orders Placed This Encounter    FLU (FLUAD QUAD INFLUENZA VACCINE,QUAD,ADJUVANTED)    VITAMIN D, 25 HYDROXY     Standing Status:   Future     Standing Expiration Date:   67/4/6950    METABOLIC PANEL, COMPREHENSIVE     Standing Status:   Future     Standing Expiration Date:   11/1/2022    LIPID PANEL     Standing Status:   Future     Standing Expiration Date:   11/1/2022     RTC 6 months      Ivan Feliciano MD

## 2021-11-01 NOTE — PATIENT INSTRUCTIONS

## 2021-11-02 LAB
25(OH)D3 SERPL-MCNC: 24.2 NG/ML (ref 30–100)
ALBUMIN SERPL-MCNC: 3.5 G/DL (ref 3.5–5)
ALBUMIN/GLOB SERPL: 1.2 {RATIO} (ref 1.1–2.2)
ALP SERPL-CCNC: 103 U/L (ref 45–117)
ALT SERPL-CCNC: 15 U/L (ref 12–78)
ANION GAP SERPL CALC-SCNC: 2 MMOL/L (ref 5–15)
AST SERPL-CCNC: 10 U/L (ref 15–37)
BILIRUB SERPL-MCNC: 0.4 MG/DL (ref 0.2–1)
BUN SERPL-MCNC: 10 MG/DL (ref 6–20)
BUN/CREAT SERPL: 11 (ref 12–20)
CALCIUM SERPL-MCNC: 9.3 MG/DL (ref 8.5–10.1)
CHLORIDE SERPL-SCNC: 105 MMOL/L (ref 97–108)
CHOLEST SERPL-MCNC: 172 MG/DL
CO2 SERPL-SCNC: 28 MMOL/L (ref 21–32)
CREAT SERPL-MCNC: 0.9 MG/DL (ref 0.55–1.02)
GLOBULIN SER CALC-MCNC: 3 G/DL (ref 2–4)
GLUCOSE SERPL-MCNC: 113 MG/DL (ref 65–100)
HDLC SERPL-MCNC: 51 MG/DL
HDLC SERPL: 3.4 {RATIO} (ref 0–5)
LDLC SERPL CALC-MCNC: 100.4 MG/DL (ref 0–100)
POTASSIUM SERPL-SCNC: 4.9 MMOL/L (ref 3.5–5.1)
PROT SERPL-MCNC: 6.5 G/DL (ref 6.4–8.2)
SODIUM SERPL-SCNC: 135 MMOL/L (ref 136–145)
TRIGL SERPL-MCNC: 103 MG/DL (ref ?–150)
VLDLC SERPL CALC-MCNC: 20.6 MG/DL

## 2022-01-06 ENCOUNTER — TELEPHONE (OUTPATIENT)
Dept: FAMILY MEDICINE CLINIC | Age: 82
End: 2022-01-06

## 2022-01-06 NOTE — TELEPHONE ENCOUNTER
----- Message from Elias Jason sent at 1/6/2022 10:29 AM EST -----  Subject: Appointment Request    Reason for Call: Urgent Cough Cold    QUESTIONS  Type of Appointment? Established Patient  Reason for appointment request? No appointments available during search  Additional Information for Provider? Pt is having coughing and fluid on   her ear would like to see Dr. Suraj Fabian  ---------------------------------------------------------------------------  --------------  CALL BACK INFO  What is the best way for the office to contact you? OK to leave message on   voicemail  Preferred Call Back Phone Number? [phone]  ---------------------------------------------------------------------------  --------------  SCRIPT ANSWERS  Relationship to Patient? Self  Specialty Confirmation? Primary Care  Are you currently unable to finish sentences due to any difficulty   breathing? No  Are you unable to swallow liquids? No  Are you having fevers (100.4 or greater), chills, or sweats? No  Do you have COPD, asthma or a chronic lung condition? Yes   Have you been diagnosed with, awaiting test results for, or told that you   are suspected of having COVID-19 (Coronavirus)? (If patient has tested   negative or was tested as a requirement for work, school, or travel and   not based on symptoms, answer no)? No  Within the past two weeks have you developed any of the following symptoms   (answer no if symptoms have been present longer than 2 weeks or began   more than 2 weeks ago)? Fever or Chills, Cough, Shortness of breath or   difficulty breathing, Loss of taste or smell, Sore throat, Nasal   congestion, Sneezing or runny nose, Fatigue or generalized body aches   (answer no if pain is specific to a body part e.g. back pain), Diarrhea,   Headache?  Yes
I attempted to call this patient. No answer.
Adequate: hears normal conversation without difficulty

## 2022-01-07 ENCOUNTER — VIRTUAL VISIT (OUTPATIENT)
Dept: FAMILY MEDICINE CLINIC | Age: 82
End: 2022-01-07
Payer: MEDICARE

## 2022-01-07 DIAGNOSIS — J42 CHRONIC BRONCHITIS, UNSPECIFIED CHRONIC BRONCHITIS TYPE (HCC): Primary | ICD-10-CM

## 2022-01-07 DIAGNOSIS — I10 ESSENTIAL HYPERTENSION: ICD-10-CM

## 2022-01-07 PROCEDURE — G2025 DIS SITE TELE SVCS RHC/FQHC: HCPCS | Performed by: FAMILY MEDICINE

## 2022-01-07 RX ORDER — PREDNISONE 20 MG/1
TABLET ORAL
Qty: 18 TABLET | Refills: 0 | Status: SHIPPED | OUTPATIENT
Start: 2022-01-07 | End: 2022-01-16

## 2022-01-07 RX ORDER — DOXYCYCLINE 100 MG/1
100 CAPSULE ORAL 2 TIMES DAILY
Qty: 20 CAPSULE | Refills: 0 | Status: SHIPPED | OUTPATIENT
Start: 2022-01-07 | End: 2022-01-17 | Stop reason: ALTCHOICE

## 2022-01-07 RX ORDER — METOPROLOL TARTRATE 50 MG/1
TABLET ORAL
Qty: 90 TABLET | Refills: 0 | Status: SHIPPED | OUTPATIENT
Start: 2022-01-07 | End: 2022-04-04

## 2022-01-09 ENCOUNTER — HOSPITAL ENCOUNTER (EMERGENCY)
Age: 82
Discharge: HOME | End: 2022-01-09
Attending: EMERGENCY MEDICINE
Payer: MEDICARE

## 2022-01-09 ENCOUNTER — APPOINTMENT (OUTPATIENT)
Dept: GENERAL RADIOLOGY | Age: 82
End: 2022-01-09
Attending: EMERGENCY MEDICINE
Payer: MEDICARE

## 2022-01-09 VITALS
OXYGEN SATURATION: 96 % | BODY MASS INDEX: 31.58 KG/M2 | WEIGHT: 184 LBS | SYSTOLIC BLOOD PRESSURE: 137 MMHG | RESPIRATION RATE: 20 BRPM | DIASTOLIC BLOOD PRESSURE: 87 MMHG | HEART RATE: 90 BPM | TEMPERATURE: 99.5 F

## 2022-01-09 DIAGNOSIS — R91.1 PULMONARY NODULE 1 CM OR GREATER IN DIAMETER: ICD-10-CM

## 2022-01-09 DIAGNOSIS — Z20.822 SUSPECTED COVID-19 VIRUS INFECTION: Primary | ICD-10-CM

## 2022-01-09 PROCEDURE — U0005 INFEC AGEN DETEC AMPLI PROBE: HCPCS

## 2022-01-09 PROCEDURE — 99283 EMERGENCY DEPT VISIT LOW MDM: CPT

## 2022-01-09 PROCEDURE — 71046 X-RAY EXAM CHEST 2 VIEWS: CPT

## 2022-01-09 NOTE — ED PROVIDER NOTES
[facility]  EMERGENCY DEPARTMENT HISTORY AND PHYSICAL EXAM         Date of Service: 1/9/2022   Patient Name: Gaby Irwin   YOB: 1940  Medical Record Number: [id number]    History of Presenting Illness     Chief Complaint   Patient presents with    Concern For OLCAW-44 (Coronavirus)        History Provided By:  patient    Additional History:   Gaby Irwin is a 80 y.o. female who presents ambulatory to the ED with cc of cough, malaise, loss of taste and smell, intermittent headaches, myalgias. She is vaccinated but not boosted for COVID. Denies N/V/D. She has no known exposure. There are no other complaints, changes or physical findings at this time.     Primary Care Provider: Roberth Bravo MD   Specialist:    Past History     Past Medical History:   Past Medical History:   Diagnosis Date    Age-related osteoporosis without current pathological fracture 7/20/2020    Hips. 7/2020    Anxiety     panic attacks    Arthritis     Bilateral exudative age-related macular degeneration ([facility] [address] 12/20/2019    Cancer ([facility] [address] 2012    hepatic flexure cancer; resected    Chronic pain     mid & upper back, shouldet    GERD (gastroesophageal reflux disease)     controlled with med    H/o Lyme disease 2015    Hematuria     Hypercholesterolemia     Hypertension     Pneumonia     bronchitis 2011         pneumonia twice in life    Status post colonoscopy with polypectomy         Past Surgical History:   Past Surgical History:   Procedure Laterality Date    COLONOSCOPY N/A 9/21/2016    COLONOSCOPY performed [facility] Damien [facility] De Oca [facility] AMBULATORY OR    HX GI      partial colectomy    HX OTHER SURGICAL      hemorrhoidectomy    HX TONSILLECTOMY      HX TUBAL LIGATION  1971        Family History:   Family History   Problem Relation Age of Onset    Heart Disease Mother     Kidney Disease Father     Seizures Father         Social History:   Social History Tobacco Use    Smoking status: Current Every Day Smoker     Packs/day: 0.50     Years: 56.00     Pack years: 28.00    Smokeless tobacco: Never Used   Vaping Use    Vaping Use: Never used   Substance Use Topics    Alcohol use: No    Drug use: No        Allergies: Allergies   Allergen Reactions    Bactrim [Sulfamethoprim Ds] Other (comments)     Avoids due to mother's severe reaction- (has never had bactrim)    Penicillins Other (comments)     Severe headches    Sulfasalazine Drowsiness     Other reaction(s): Other (comments)  Avoids due to mother's severe reaction- (has never had bactrim)    Ciprofloxacin Nausea Only    Flagyl [Metronidazole] Other (comments)     Disoriented, unable to function normally        Review of Systems   Review of Systems   Constitutional: Positive for fatigue. Negative for appetite change, chills and fever. HENT: Positive for congestion. Eyes: Negative for visual disturbance. Respiratory: Positive for cough, chest tightness and shortness of breath. Negative for wheezing. Cardiovascular: Negative for chest pain, palpitations and leg swelling. Gastrointestinal: Negative for abdominal pain, diarrhea, nausea and vomiting. Genitourinary: Negative for dysuria, frequency and urgency. Musculoskeletal: Positive for myalgias. Negative for back pain, joint swelling and neck stiffness. Skin: Negative for rash. Neurological: Negative for dizziness, syncope, weakness and headaches. Hematological: Negative for adenopathy. Psychiatric/Behavioral: Negative for behavioral problems and dysphoric mood. Physical Exam  Physical Exam  Vitals (VSS) and nursing note reviewed. Constitutional:       General: She is not in acute distress. Appearance: She is well-developed. HENT:      Head: Normocephalic and atraumatic. Eyes:      General: No scleral icterus. Conjunctiva/sclera: Conjunctivae normal.      Pupils: Pupils are equal, round, and reactive to light. Cardiovascular:      Rate and Rhythm: Normal rate and regular rhythm. Heart sounds: No murmur heard. No gallop. Pulmonary:      Effort: Pulmonary effort is normal. No respiratory distress. Breath sounds: No stridor. No wheezing or rales. Abdominal:      General: Bowel sounds are normal. There is no distension. Palpations: Abdomen is soft. There is no mass. Tenderness: There is no abdominal tenderness. There is no guarding or rebound. Musculoskeletal:         General: Normal range of motion. Cervical back: Normal range of motion and neck supple. Lymphadenopathy:      Cervical: No cervical adenopathy. Skin:     General: Skin is warm and dry. Findings: No erythema or rash. Neurological:      Mental Status: She is alert and oriented to person, place, and time. Cranial Nerves: No cranial nerve deficit. Coordination: Coordination normal.         Medical Decision Making   I am the first provider for this patient. I reviewed the vital signs, available nursing notes, past medical history, past surgical history, family history and social history. Old Medical Records: PCP notes      ED Course:  12:47 PM   Initial assessment performed. The patients presenting problems have been discussed, and they are in agreement with the care plan formulated and outlined with them. I have encouraged them to ask questions as they arise throughout their visit. Progress Notes:  12:50 PM  Probable COVID. Will check CXR to exclude PNA, send COVID PCR. Huan Whitaker MD    2:06 PM  CXR clear except a >1 cm right pulmonary nodule. Needs non emergent CT scanning. Will D/C, forward chart to PCP. Pt thinks doxycycline is worsening her sx. I advised her to stop taking it, but continue her steroid, F/U PCP next week to have outpatient CT scheduled.   Huan Whitaker MD    Procedures:   Procedures    Diagnostic Study Results   Labs -    No results found for this or any previous visit (from the past 12 hour(s)). Radiologic Studies -  The following have been ordered and reviewed:  XR CHEST PA LAT   Final Result   Right midlung pulmonary nodule. CT chest is recommended for further evaluation. The findings were called to Dr. Feilpe Landaverde on 1/9/2022 1:45 PM by Dr. Ayan Quiroz. [id number]           CT Results  (Last 48 hours)    None        CXR Results  (Last 48 hours)               01/09/22 1324  XR CHEST PA LAT Final result    Impression:  Right midlung pulmonary nodule. CT chest is recommended for further evaluation. The findings were called to Dr. Felipe Landaverde on 1/9/2022 1:45 PM by Dr. Ayan Quiroz. [id number]           Narrative:  PA AND LATERAL CHEST RADIOGRAPHS: 1/9/2022 1:24 PM       INDICATION: Chest pain. COMPARISON: 8/12/2020, 12/9/2019. TECHNIQUE: Frontal and lateral radiographs of the chest.       FINDINGS:   A nodule in the right midlung field measures 10 x 15 mm. It appears spiculated   and centrally cavitated. The lungs are otherwise clear. The central airways are   patent. The heart size is normal. No pneumothorax or pleural effusion. Vital Signs-Reviewed the patient's vital signs. Patient Vitals for the past 12 hrs:   Temp Pulse Resp BP SpO2   01/09/22 1345  90 20 137/87 96 %   01/09/22 1246 99.5 °F (37.5 °C) 95 18 (!) 146/88 97 %       Medications Given in the ED:  Medications - No data to display    Diagnosis:  Clinical Impression:   1. Suspected COVID-19 virus infection    2. Pulmonary nodule 1 cm or greater in diameter         Plan:  1:   Follow-up Information     Follow up With Specialties Details Why Contact Mary Giraldo MD Family Medicine Call in 1 day To schedule CT scan of thorax [address] [facility]  [phone]            2:   Current Discharge Medication List        Return to ED if worse. Disposition:  Home  _______________________________   Attestations:    This note was performed by Olesya Pearson MD in its entirety.   _______________________________

## 2022-01-10 ENCOUNTER — TELEPHONE (OUTPATIENT)
Dept: FAMILY MEDICINE CLINIC | Age: 82
End: 2022-01-10

## 2022-01-10 DIAGNOSIS — R91.8 MASS OF MIDDLE LOBE OF RIGHT LUNG: ICD-10-CM

## 2022-01-10 DIAGNOSIS — R91.1 INCIDENTAL LUNG NODULE, GREATER THAN OR EQUAL TO 8MM: Primary | ICD-10-CM

## 2022-01-10 LAB
SARS-COV-2, XPLCVT: NOT DETECTED
SOURCE, COVRS: NORMAL

## 2022-01-10 NOTE — TELEPHONE ENCOUNTER
Spoke to patient after verifying two identifiers, informed per provider a CT scan was ordered due to results from chest Xray. Hospital will call and schedule. Acknowledged understanding.

## 2022-01-10 NOTE — TELEPHONE ENCOUNTER
Please call her and tell her we have set up a CT scan to evaluate the lung nodule on her chest xray.  Hospital will call her.  smg

## 2022-01-17 ENCOUNTER — VIRTUAL VISIT (OUTPATIENT)
Dept: FAMILY MEDICINE CLINIC | Age: 82
End: 2022-01-17
Payer: MEDICARE

## 2022-01-17 DIAGNOSIS — B37.0 THRUSH, ORAL: ICD-10-CM

## 2022-01-17 DIAGNOSIS — J01.00 ACUTE NON-RECURRENT MAXILLARY SINUSITIS: Primary | ICD-10-CM

## 2022-01-17 PROCEDURE — G2025 DIS SITE TELE SVCS RHC/FQHC: HCPCS | Performed by: FAMILY MEDICINE

## 2022-01-17 RX ORDER — AZITHROMYCIN 250 MG/1
TABLET, FILM COATED ORAL
Qty: 6 TABLET | Refills: 0 | Status: SHIPPED | OUTPATIENT
Start: 2022-01-17 | End: 2022-01-22

## 2022-01-17 RX ORDER — NYSTATIN 100000 [USP'U]/ML
200000 SUSPENSION ORAL 4 TIMES DAILY
Qty: 60 ML | Refills: 0 | Status: SHIPPED | OUTPATIENT
Start: 2022-01-17 | End: 2022-06-15

## 2022-01-18 ENCOUNTER — TELEPHONE (OUTPATIENT)
Dept: FAMILY MEDICINE CLINIC | Age: 82
End: 2022-01-18

## 2022-01-18 ENCOUNTER — NURSE TRIAGE (OUTPATIENT)
Dept: OTHER | Facility: CLINIC | Age: 82
End: 2022-01-18

## 2022-01-18 NOTE — TELEPHONE ENCOUNTER
----- Message from Tennille Hoang sent at 1/18/2022  2:48 PM EST -----  Subject: Message to Provider    QUESTIONS  Information for Provider? PT would like to know if she can get an   abdominal CT ordered for her. States she is getting a CT done on 1/19/22   and would like to do both at the same time. Would also like to know if   there is anything she can take to soothe her stomach since she has been   sick for 3 weeks. PT just had VV on 1/17/22. Please advise with pt.   ---------------------------------------------------------------------------  --------------  CALL BACK INFO  What is the best way for the office to contact you? OK to leave message on   voicemail  Preferred Call Back Phone Number? [phone]  ---------------------------------------------------------------------------  --------------  SCRIPT ANSWERS  Relationship to Patient?  Self

## 2022-01-18 NOTE — TELEPHONE ENCOUNTER
Received call from [facility] with Red Flag Complaint. Subjective: Caller states \"abdominal pain, soreness/tightness, stool sometimes dark, lower intestines removed x10 years ago, CT scan scheduled tomorrow for right side of chest. \"     Current Symptoms: abdominal pain right side lower sore, comes and goes    Onset: 3 weeks ago; intermittent    Associated Symptoms: diarrhea    Pain Severity: 2/10; cramping; intermittent    Temperature: Denies fever      What has been tried: nothing    LMP: NA Pregnant: NA    Recommended disposition: See PCP within 24 hours, or be seen in [facility] Garsia Manuel advice provided, patient verbalizes understanding; denies any other questions or concerns; instructed to call back for any new or worsening symptoms. Writer provided warm transfer to [facility]  at [facility] for appointment scheduling    Attention Provider: Thank you for allowing me to participate in the care of your patient. The patient was connected to triage in response to information provided to the ECC. Please do not respond through this encounter as the response is not directed to a shared pool.     Reason for Disposition   [1] MILD pain (e.g., does not interfere with normal activities) AND [2] pain comes and goes (cramps) AND [3] present > 48 hours  (Exception: this same abdominal pain is a chronic symptom recurrent or ongoing AND present > 4 weeks)    Protocols used: ABDOMINAL PAIN - [facility]

## 2022-01-19 ENCOUNTER — HOSPITAL ENCOUNTER (OUTPATIENT)
Dept: CT IMAGING | Age: 82
Discharge: HOME | End: 2022-01-19
Attending: FAMILY MEDICINE
Payer: MEDICARE

## 2022-01-19 ENCOUNTER — OFFICE VISIT (OUTPATIENT)
Dept: FAMILY MEDICINE CLINIC | Age: 82
End: 2022-01-19
Payer: MEDICARE

## 2022-01-19 VITALS
HEIGHT: 64 IN | TEMPERATURE: 96.9 F | OXYGEN SATURATION: 97 % | BODY MASS INDEX: 30.24 KG/M2 | WEIGHT: 177.13 LBS | DIASTOLIC BLOOD PRESSURE: 74 MMHG | HEART RATE: 82 BPM | RESPIRATION RATE: 20 BRPM | SYSTOLIC BLOOD PRESSURE: 131 MMHG

## 2022-01-19 DIAGNOSIS — R91.1 INCIDENTAL LUNG NODULE, GREATER THAN OR EQUAL TO 8MM: ICD-10-CM

## 2022-01-19 DIAGNOSIS — K52.1 ANTIBIOTIC-ASSOCIATED DIARRHEA: ICD-10-CM

## 2022-01-19 DIAGNOSIS — R91.1 INCIDENTAL LUNG NODULE, GREATER THAN OR EQUAL TO 8MM: Primary | ICD-10-CM

## 2022-01-19 DIAGNOSIS — T36.95XA ANTIBIOTIC-ASSOCIATED DIARRHEA: ICD-10-CM

## 2022-01-19 DIAGNOSIS — J42 CHRONIC BRONCHITIS, UNSPECIFIED CHRONIC BRONCHITIS TYPE (HCC): ICD-10-CM

## 2022-01-19 PROCEDURE — 71250 CT THORAX DX C-: CPT

## 2022-01-19 PROCEDURE — 99213 OFFICE O/P EST LOW 20 MIN: CPT | Performed by: FAMILY MEDICINE

## 2022-01-19 NOTE — PROGRESS NOTES
1. Have you been to the ER, urgent care clinic since your last visit? Hospitalized since your last visit? No    2. Have you seen or consulted any other health care providers outside of the [address] [place] [address] since your last visit? Include any pap smears or colon screening. No     1/19/2022      Chief Complaint   Patient presents with    Abdominal Pain     RLQ     Stool Color Change    Diarrhea         History of Present Illness:        Themedhat Fulton is a 80 y.o. female whose breathing is finally better after prednisone and two courses of abx. She is most concerned that her abdominal pain and loose stools in the last week are signs her colon cancer could be back and she would like a CT when she goes for chest CT this afternoon. Bowels are loose and dark after meals, 3-4 times daily. No fever, some crampy R sided pain. Had negative CT abdomen in 3/2021. Cancer resected 2009, was followed by surgery for five years. Allergies   Allergen Reactions    Bactrim [Sulfamethoprim Ds] Other (comments)     Avoids due to mother's severe reaction- (has never had bactrim)    Penicillins Other (comments)     Severe headches    Sulfasalazine Drowsiness     Other reaction(s): Other (comments)  Avoids due to mother's severe reaction- (has never had bactrim)    Ciprofloxacin Nausea Only    Flagyl [Metronidazole] Other (comments)     Disoriented, unable to function normally       Current Outpatient Medications   Medication Sig    azithromycin (ZITHROMAX) 250 mg tablet Take 2 tablets today, then take 1 tablet daily    nystatin (MYCOSTATIN) 100,000 unit/mL suspension Take 2 mL by mouth four (4) times daily.  swish and spit    metoprolol tartrate (LOPRESSOR) 50 mg tablet TAKE 1/2 TABLET BY MOUTH 2 TIMES A DAY FOR BLOOD PRESSURE    atorvastatin (LIPITOR) 40 mg tablet TAKE 1 TABLET BY MOUTH EVERY DAY FOR CHOLESTEROL    amLODIPine (NORVASC) 5 mg tablet TAKE 1 TABLET BY MOUTH EVERY DAY    albuterol (ProAir HFA) 90 mcg/actuation inhaler INHALE 2 PUFFS BY MOUTH EVERY 4 HOURS AS NEEDED FOR COUGH/WHEEZE    traZODone (DESYREL) 50 mg tablet 1/2 tab at bedtime. May increase to 1 tab nightly    omeprazole (PRILOSEC OTC) 20 mg tablet Take 20 mg by mouth daily. Indications: gastroesophageal reflux disease    aspirin 81 mg tablet Take 81 mg by mouth daily (after lunch).  FLUoxetine (PROzac) 20 mg capsule TAKE 1 CAPSULE BY MOUTH EVERY DAY    albuterol (ACCUNEB) 1.25 mg/3 mL nebu Take 3 mL by inhalation every four (4) hours as needed for Wheezing. (Patient not taking: Reported on 1/7/2022)    hydrOXYzine HCL (ATARAX) 25 mg tablet Take 1 Tablet by mouth nightly as needed for Anxiety. Indications: anxious (Patient not taking: Reported on 8/10/2021)    magnesium citrate solution Drink entire bottle. If you have not had a large amount of stool output within an hour, drink the second bottle. (Patient not taking: Reported on 8/10/2021)    polyethylene glycol (Miralax) 17 gram/dose powder Take 17 g by mouth daily. 1 tablespoon with 8 oz of water daily (Patient not taking: Reported on 8/10/2021)    alendronate (FOSAMAX) 70 mg tablet Take 1 Tab by mouth every seven (7) days. Take in am with plain 8-12 oz plain water on empty stomach. Do not eat,drink, or lie down for 30 minutes after taking. (Patient not taking: Reported on 1/7/2022)     No current facility-administered medications for this visit. Physical Examination:    Visit Vitals  /74 (BP 1 Location: Left upper arm, BP Patient Position: Sitting, BP Cuff Size: Adult)   Pulse 82   Temp 96.9 °F (36.1 °C) (Oral)   Resp 20   Ht 5' 4\" (1.626 m)   Wt 177 lb 2 oz (80.3 kg)   SpO2 97%   BMI 30.40 kg/m²      General:  Alert, cooperative, no distress. HEENT:  Normocephalic, without obvious abnormality, atraumatic. Conjunctivae/corneas clear. Pupils equal, round, reactive to light. Extraocular movements intact. TMs and external canals normal bilaterally.  Nasal mucosa and oropharynx clear. Lungs: Clear to auscultation bilaterally. Chest wall:  No tenderness or deformity. Heart:  Regular rate and rhythm, S1, S2 normal, no murmur, click, rub, or gallop. Abdomen:   Soft, non-tender. Bowel sounds normal. No masses. No organomegaly. Extremities: Extremities normal, atraumatic, no cyanosis or edema. Pulses: 2+ and symmetric all extremities. Skin: Skin color, texture, turgor normal. No rashes or lesions. Lymph nodes: Cervical, supraclavicular, and axillary nodes normal.   Neurologic: CNII-XII intact. Normal strength, sensation, and reflexes throughout. ASSESSMENT AND PLAN    1. Incidental lung nodule, greater than or equal to 8mm  For CT today    2. Chronic bronchitis, unspecified chronic bronchitis type ([address]  resolving    3. Antibiotic-associated diarrhea  Explained that change in bowels is explainable and that she doesn't need CT at this point. No orders of the defined types were placed in this encounter.           Yeimi Preston MD

## 2022-01-19 NOTE — TELEPHONE ENCOUNTER
I have called and left a message for this patient. She can double up on Omeprazole and stop taking Fosamax until her tummy is better.

## 2022-01-19 NOTE — PROGRESS NOTES
Discussed result with patient. Nodule suspicious for malignancy. Will set up IR CT guided biopsy. Questions answered.

## 2022-01-21 ENCOUNTER — TELEPHONE (OUTPATIENT)
Dept: FAMILY MEDICINE CLINIC | Age: 82
End: 2022-01-21

## 2022-01-21 DIAGNOSIS — R91.8 MASS OF MIDDLE LOBE OF RIGHT LUNG: Primary | ICD-10-CM

## 2022-01-21 NOTE — TELEPHONE ENCOUNTER
Discussed with Dr. Snell Lie in IR. He feels the location along a fissure makes the risk of pneumothorax high and the actual nodule is of mixed rather than completely solid density. He felt a PET CT to evaluate metabolic activity would help determine if potential benefit outweighs risk. I have discussed with the patient and answered her questions.

## 2022-01-21 NOTE — TELEPHONE ENCOUNTER
Call from Haley at [address] [facility] [address] States Dr. Uriah Quintero did not approve the biopsy; the nodule is sub solid. He recommends a 3 month follow up, and a PET CT scan if the problem persists. Haley can be reached at [phone] if you have any questions.

## 2022-03-19 PROBLEM — F41.9 ANXIETY AND DEPRESSION: Status: ACTIVE | Noted: 2017-09-19

## 2022-03-19 PROBLEM — F32.A ANXIETY AND DEPRESSION: Status: ACTIVE | Noted: 2017-09-19

## 2022-03-19 PROBLEM — H35.3230 BILATERAL EXUDATIVE AGE-RELATED MACULAR DEGENERATION (HCC): Status: ACTIVE | Noted: 2019-12-20

## 2022-03-20 PROBLEM — M81.0 AGE-RELATED OSTEOPOROSIS WITHOUT CURRENT PATHOLOGICAL FRACTURE: Status: ACTIVE | Noted: 2020-07-20

## 2022-03-21 ENCOUNTER — HOSPITAL ENCOUNTER (OUTPATIENT)
Dept: CT IMAGING | Age: 82
Discharge: HOME | End: 2022-03-21
Attending: INTERNAL MEDICINE
Payer: MEDICARE

## 2022-03-21 DIAGNOSIS — R19.12 INCREASED BOWEL SOUNDS: ICD-10-CM

## 2022-03-21 DIAGNOSIS — K52.1 TOXIC GASTROENTERITIS AND COLITIS: ICD-10-CM

## 2022-03-21 PROCEDURE — 74176 CT ABD & PELVIS W/O CONTRAST: CPT

## 2022-03-23 ENCOUNTER — TRANSCRIBE ORDER (OUTPATIENT)
Dept: SCHEDULING | Age: 82
End: 2022-03-23

## 2022-03-23 ENCOUNTER — HOSPITAL ENCOUNTER (EMERGENCY)
Age: 82
Discharge: HOME | End: 2022-03-23
Attending: EMERGENCY MEDICINE
Payer: MEDICARE

## 2022-03-23 VITALS
OXYGEN SATURATION: 98 % | DIASTOLIC BLOOD PRESSURE: 72 MMHG | RESPIRATION RATE: 18 BRPM | SYSTOLIC BLOOD PRESSURE: 147 MMHG | TEMPERATURE: 98 F | HEART RATE: 76 BPM

## 2022-03-23 DIAGNOSIS — K81.9 CHOLECYSTITIS: ICD-10-CM

## 2022-03-23 DIAGNOSIS — R19.12 INCREASED BOWEL SOUNDS: Primary | ICD-10-CM

## 2022-03-23 DIAGNOSIS — R11.0 NAUSEA IN ADULT: Primary | ICD-10-CM

## 2022-03-23 LAB
APPEARANCE UR: CLEAR
BACTERIA URNS QL MICRO: NEGATIVE /HPF
BILIRUB UR QL: NEGATIVE
COLOR UR: ABNORMAL
EPITH CASTS URNS QL MICRO: ABNORMAL /LPF
FLUAV RNA SPEC QL NAA+PROBE: NOT DETECTED
FLUBV RNA SPEC QL NAA+PROBE: NOT DETECTED
GLUCOSE UR STRIP.AUTO-MCNC: NEGATIVE MG/DL
HGB UR QL STRIP: ABNORMAL
KETONES UR QL STRIP.AUTO: NEGATIVE MG/DL
LEUKOCYTE ESTERASE UR QL STRIP.AUTO: NEGATIVE
NITRITE UR QL STRIP.AUTO: NEGATIVE
PH UR STRIP: 6 [PH] (ref 5–8)
PROT UR STRIP-MCNC: NEGATIVE MG/DL
RBC #/AREA URNS HPF: ABNORMAL /HPF (ref 0–5)
SARS-COV-2, COV2: NOT DETECTED
SP GR UR REFRACTOMETRY: 1.02 (ref 1–1.03)
UA: UC IF INDICATED,UAUC: ABNORMAL
UROBILINOGEN UR QL STRIP.AUTO: 0.2 EU/DL (ref 0.2–1)
WBC URNS QL MICRO: ABNORMAL /HPF (ref 0–4)

## 2022-03-23 PROCEDURE — 81001 URINALYSIS AUTO W/SCOPE: CPT

## 2022-03-23 PROCEDURE — 87636 SARSCOV2 & INF A&B AMP PRB: CPT

## 2022-03-23 PROCEDURE — 99283 EMERGENCY DEPT VISIT LOW MDM: CPT

## 2022-03-23 RX ORDER — CETIRIZINE HCL 10 MG
10 TABLET ORAL DAILY
Qty: 30 TABLET | Refills: 2 | Status: SHIPPED | OUTPATIENT
Start: 2022-03-23

## 2022-03-23 NOTE — ED PROVIDER NOTES
EMERGENCY DEPARTMENT HISTORY AND PHYSICAL EXAM      Date: 3/23/2022  Patient Name: Jag Sawant    History of Presenting Illness     Chief Complaint   Patient presents with    Generalized Body Aches    Back Pain       History Provided By: Patient    HPI: Jag Sawant, 80 y.o. female with PMHx significant for GERD, hypertension, high cholesterol, presents ambulatory to the ED with cc of generalized body aches. Patient reports she saw her primary doctor on March 21 Dr. Ramila Herrera. She was diagnosed with gastroenteritis and urinary frequency. She was placed on an antibiotic Charolotte Gerardo) but then office called her today and said the culture did not grow out anything and to stop the antibiotic. When she told him she was not feeling any better they recommended she come to the ER to be evaluated. CT abdomen pelvis was out any acute findings. She denies any nausea, vomiting or diarrhea today. She states for the past several months she wakes up every morning with significant nasal congestion which typically improves throughout the day. Today was like any other. She reports moderate nasal congestion and mild nausea. By the time she came to the ER to be evaluated her symptoms have resolved. Currently denies any nasal congestion. Denies any cough. She has some persistent right sided back pain that started after she was raking leaves about a week ago. She denies any significant abdominal pain. She denies any chest pain or shortness of breath. PMHx: Significant for GERD, hypertension, high cholesterol  PSHx: Significant for tonsillectomy, BTL, colonoscopy, partial colectomy colon cancer approximately 10 years ago. In remission. Social Hx: Pack-a-day smoker. There are no other complaints, changes, or physical findings at this time. PCP: Zeyad Scott, DO    No current facility-administered medications on file prior to encounter.      Current Outpatient Medications on File Prior to Encounter Medication Sig Dispense Refill    nystatin (MYCOSTATIN) 100,000 unit/mL suspension Take 2 mL by mouth four (4) times daily. swish and spit 60 mL 0    metoprolol tartrate (LOPRESSOR) 50 mg tablet TAKE 1/2 TABLET BY MOUTH 2 TIMES A DAY FOR BLOOD PRESSURE 90 Tablet 0    FLUoxetine (PROzac) 20 mg capsule TAKE 1 CAPSULE BY MOUTH EVERY DAY 90 Capsule 1    albuterol (ACCUNEB) 1.25 mg/3 mL nebu Take 3 mL by inhalation every four (4) hours as needed for Wheezing. (Patient not taking: Reported on 1/7/2022) 25 Each 5    hydrOXYzine HCL (ATARAX) 25 mg tablet Take 1 Tablet by mouth nightly as needed for Anxiety. Indications: anxious (Patient not taking: Reported on 8/10/2021) 30 Tablet 0    magnesium citrate solution Drink entire bottle. If you have not had a large amount of stool output within an hour, drink the second bottle. (Patient not taking: Reported on 8/10/2021) 2 Bottle 0    polyethylene glycol (Miralax) 17 gram/dose powder Take 17 g by mouth daily. 1 tablespoon with 8 oz of water daily (Patient not taking: Reported on 8/10/2021) 235 g 0    atorvastatin (LIPITOR) 40 mg tablet TAKE 1 TABLET BY MOUTH EVERY DAY FOR CHOLESTEROL 90 Tab 0    amLODIPine (NORVASC) 5 mg tablet TAKE 1 TABLET BY MOUTH EVERY DAY 90 Tab 0    alendronate (FOSAMAX) 70 mg tablet Take 1 Tab by mouth every seven (7) days. Take in am with plain 8-12 oz plain water on empty stomach. Do not eat,drink, or lie down for 30 minutes after taking. (Patient not taking: Reported on 1/7/2022) 13 Tab 3    albuterol (ProAir HFA) 90 mcg/actuation inhaler INHALE 2 PUFFS BY MOUTH EVERY 4 HOURS AS NEEDED FOR COUGH/WHEEZE      traZODone (DESYREL) 50 mg tablet 1/2 tab at bedtime. May increase to 1 tab nightly 45 Tab 1    omeprazole (PRILOSEC OTC) 20 mg tablet Take 20 mg by mouth daily. Indications: gastroesophageal reflux disease      aspirin 81 mg tablet Take 81 mg by mouth daily (after lunch).            Past History     Past Medical History:  Past Medical History:   Diagnosis Date    Age-related osteoporosis without current pathological fracture 7/20/2020    Hips. 7/2020    Anxiety     panic attacks    Arthritis     Bilateral exudative age-related macular degeneration ([facility] [address] 12/20/2019    Cancer ([facility] [address] 2012    hepatic flexure cancer; resected    Chronic pain     mid & upper back, shouldet    GERD (gastroesophageal reflux disease)     controlled with med    H/o Lyme disease 2015    Hematuria     Hypercholesterolemia     Hypertension     Pneumonia     bronchitis 2011         pneumonia twice in life    Status post colonoscopy with polypectomy        Past Surgical History:  Past Surgical History:   Procedure Laterality Date    COLONOSCOPY N/A 9/21/2016    COLONOSCOPY performed by Ramon Camacho MD at hospitals AMBULATORY OR    HX GI      partial colectomy    HX OTHER SURGICAL      hemorrhoidectomy    HX TONSILLECTOMY      HX TUBAL LIGATION  1971       Family History:  Family History   Problem Relation Age of Onset    Heart Disease Mother     Kidney Disease Father     Seizures Father        Social History:  Social History     Tobacco Use    Smoking status: Current Every Day Smoker     Packs/day: 0.50     Years: 56.00     Pack years: 28.00    Smokeless tobacco: Never Used   Vaping Use    Vaping Use: Never used   Substance Use Topics    Alcohol use: No    Drug use: No       Allergies: Allergies   Allergen Reactions    Bactrim [Sulfamethoprim Ds] Other (comments)     Avoids due to mother's severe reaction- (has never had bactrim)    Penicillins Other (comments)     Severe headches    Sulfasalazine Drowsiness     Other reaction(s): Other (comments)  Avoids due to mother's severe reaction- (has never had bactrim)    Ciprofloxacin Nausea Only    Flagyl [Metronidazole] Other (comments)     Disoriented, unable to function normally         Review of Systems   Review of Systems   Constitutional: Negative for activity change, chills and fever. HENT: Positive for congestion, sinus pressure and sinus pain. Negative for sore throat. Eyes: Negative for pain and redness. Respiratory: Positive for cough. Negative for chest tightness and shortness of breath. Cardiovascular: Negative for chest pain and palpitations. Gastrointestinal: Positive for nausea. Negative for abdominal pain, diarrhea and vomiting. Genitourinary: Negative for dysuria, frequency and urgency. Musculoskeletal: Negative for back pain and neck pain. Skin: Negative for rash. Neurological: Negative for syncope, light-headedness and headaches. Psychiatric/Behavioral: Negative for confusion. All other systems reviewed and are negative. Physical Exam   Physical Exam  Vitals and nursing note reviewed. Constitutional:       General: She is not in acute distress. Appearance: She is well-developed. She is not diaphoretic. HENT:      Head: Normocephalic. Nose: Nose normal.      Mouth/Throat:      Pharynx: No oropharyngeal exudate. Eyes:      General: No scleral icterus. Conjunctiva/sclera: Conjunctivae normal.      Pupils: Pupils are equal, round, and reactive to light. Neck:      Thyroid: No thyromegaly. Vascular: No JVD. Trachea: No tracheal deviation. Cardiovascular:      Rate and Rhythm: Normal rate and regular rhythm. Heart sounds: No murmur heard. No friction rub. No gallop. Pulmonary:      Effort: Pulmonary effort is normal. No respiratory distress. Breath sounds: Normal breath sounds. No stridor. No wheezing or rales. Abdominal:      General: Bowel sounds are normal. There is no distension. Palpations: Abdomen is soft. Tenderness: There is no abdominal tenderness. There is no guarding or rebound. Musculoskeletal:         General: Normal range of motion. Cervical back: Normal range of motion and neck supple. Comments: Back no midline thoracic or lumbar vertebral point tenderness.   Mild tenderness of lower thoracic and upper lumbar paraspinal muscles on the right. Toes up/down. Ambulates without difficulty. Negative SLR bilaterally. Lymphadenopathy:      Cervical: No cervical adenopathy. Skin:     General: Skin is warm and dry. Findings: No erythema or rash. Neurological:      Mental Status: She is alert and oriented to person, place, and time. Cranial Nerves: No cranial nerve deficit. Motor: No abnormal muscle tone. Coordination: Coordination normal.   Psychiatric:         Behavior: Behavior normal.             Diagnostic Study Results     Labs -   No results found for this or any previous visit (from the past 12 hour(s)). Radiologic Studies -   No orders to display     CT Results  (Last 48 hours)    None        CXR Results  (Last 48 hours)    None            Medical Decision Making   I am the first provider for this patient. I reviewed the vital signs, available nursing notes, past medical history, past surgical history, family history and social history. Vital Signs-Reviewed the patient's vital signs. No data found. Records Reviewed: Nursing notes reviewed    Provider Notes (Medical Decision Making):   DDX: 80-year-old female with right back pain for over a week after raking leaves. No neuro deficits. CT abdomen pelvis on March 21 without any acute findings. Reports daily morning nausea and nasal congestion that simply goes away by the afternoon. Will check for Covid. Suspect seasonal allergies. Abdomen soft and nontender. No significant change in symptoms to warrant repeat imaging. ED Course:   Initial assessment performed. The patients presenting problems have been discussed, and they are in agreement with the care plan formulated and outlined with them. I have encouraged them to ask questions as they arise throughout their visit. PROGRESS NOTE    Pt reevaluated. UA negative. Covid and influenza negative. Will discharge with daily Zyrtec. Recommended Tylenol or Advil PM at bedtime. Written by Sudarshan Larose MD     Progress note:    Pt noted to be feeling better and ready for discharge. Updated pt and/or family on all final lab and/or  imaging findings. Will follow up as instructed. All questions have been answered, pt voiced understanding and agreement with plan. Specific return precautions provided as well as instructions to return to the ED should sx worsen at any time. Vital signs stable for discharge. I have also put together some discharge instructions for them that include: 1) educational information regarding their diagnosis, 2) how to care for their diagnosis at home, as well a 3) list of reasons why they would want to return to the ED prior to their follow-up appointment, should their condition change. Written by Sudarshan Larose MD        Critical Care Time:   0    Disposition:  Discharge    PLAN:  1. Discharge Medication List as of 3/23/2022  1:27 PM        2. Follow-up Information     Follow up With Specialties Details Why Contact Eric Pisano DO Internal Medicine, Pediatric Medicine Schedule an appointment as soon as possible for a visit in 1 week  [address] [facility]      [address] [address] [facility] Emergency Medicine Go in 1 day If symptoms worsen [address] [facility] [id number] [phone]    Betty Love MD Colon and Rectal Surgery Schedule an appointment as soon as possible for a visit in 1 week colorectal surgery [address] [place] [id number] [phone]          Return to ED if worse     Diagnosis     Clinical Impression:   1. Nausea in adult              Please note that this dictation was completed with [employer], the computer voice recognition software. Quite often unanticipated grammatical, syntax, homophones, and other interpretive errors are inadvertently transcribed by the computer software. Please disregard these errors. Please excuse any errors that have escaped final proofreading.

## 2022-03-23 NOTE — ED NOTES
Pt's daughter stated that Pt has had increased confusion over the past few weeks. MD made aware. Daughter at bedside.

## 2022-03-30 ENCOUNTER — TRANSCRIBE ORDER (OUTPATIENT)
Dept: SCHEDULING | Age: 82
End: 2022-03-30

## 2022-03-30 DIAGNOSIS — R91.1 SOLITARY PULMONARY NODULE: Primary | ICD-10-CM

## 2022-04-04 DIAGNOSIS — I10 ESSENTIAL HYPERTENSION: ICD-10-CM

## 2022-04-04 RX ORDER — METOPROLOL TARTRATE 50 MG/1
TABLET ORAL
Qty: 90 TABLET | Refills: 0 | Status: SHIPPED | OUTPATIENT
Start: 2022-04-04 | End: 2022-07-05

## 2022-04-18 ENCOUNTER — TRANSCRIBE ORDER (OUTPATIENT)
Dept: SCHEDULING | Age: 82
End: 2022-04-18

## 2022-04-18 DIAGNOSIS — R19.12 INCREASED BOWEL SOUNDS: Primary | ICD-10-CM

## 2022-04-18 DIAGNOSIS — K81.9 CHOLECYSTITIS, UNSPECIFIED: ICD-10-CM

## 2022-05-10 ENCOUNTER — HOSPITAL ENCOUNTER (OUTPATIENT)
Dept: CT IMAGING | Age: 82
Discharge: HOME | End: 2022-05-10
Attending: INTERNAL MEDICINE
Payer: MEDICARE

## 2022-05-10 DIAGNOSIS — R91.1 SOLITARY PULMONARY NODULE: ICD-10-CM

## 2022-05-10 PROCEDURE — 71250 CT THORAX DX C-: CPT

## 2022-05-27 ENCOUNTER — TRANSCRIBE ORDER (OUTPATIENT)
Dept: SCHEDULING | Age: 82
End: 2022-05-27

## 2022-05-27 DIAGNOSIS — R91.1 SOLITARY PULMONARY NODULE: Primary | ICD-10-CM

## 2022-06-02 ENCOUNTER — TRANSCRIBE ORDER (OUTPATIENT)
Dept: SCHEDULING | Age: 82
End: 2022-06-02

## 2022-06-02 DIAGNOSIS — R91.1 SOLITARY PULMONARY NODULE: Primary | ICD-10-CM

## 2022-06-06 ENCOUNTER — TRANSCRIBE ORDER (OUTPATIENT)
Dept: SCHEDULING | Age: 82
End: 2022-06-06

## 2022-06-06 DIAGNOSIS — R91.1 SOLITARY PULMONARY NODULE: Primary | ICD-10-CM

## 2022-06-15 ENCOUNTER — HOSPITAL ENCOUNTER (OUTPATIENT)
Dept: GENERAL RADIOLOGY | Age: 82
Discharge: HOME | End: 2022-06-15
Attending: STUDENT IN AN ORGANIZED HEALTH CARE EDUCATION/TRAINING PROGRAM
Payer: MEDICARE

## 2022-06-15 ENCOUNTER — HOSPITAL ENCOUNTER (OUTPATIENT)
Dept: CT IMAGING | Age: 82
Discharge: HOME | End: 2022-06-15
Attending: INTERNAL MEDICINE
Payer: MEDICARE

## 2022-06-15 VITALS
RESPIRATION RATE: 18 BRPM | HEART RATE: 75 BPM | WEIGHT: 170 LBS | SYSTOLIC BLOOD PRESSURE: 130 MMHG | OXYGEN SATURATION: 95 % | DIASTOLIC BLOOD PRESSURE: 76 MMHG | BODY MASS INDEX: 32.1 KG/M2 | TEMPERATURE: 98.1 F | HEIGHT: 61 IN

## 2022-06-15 DIAGNOSIS — R91.1 SOLITARY PULMONARY NODULE: ICD-10-CM

## 2022-06-15 PROCEDURE — 77030040395 HC NDL BIOP COAX INTRO MRTM -B

## 2022-06-15 PROCEDURE — 77030018781

## 2022-06-15 PROCEDURE — 77012 CT SCAN FOR NEEDLE BIOPSY: CPT

## 2022-06-15 PROCEDURE — 88360 TUMOR IMMUNOHISTOCHEM/MANUAL: CPT

## 2022-06-15 PROCEDURE — 77030014115

## 2022-06-15 PROCEDURE — 2709999900 HC NON-CHARGEABLE SUPPLY

## 2022-06-15 PROCEDURE — 88333 PATH CONSLTJ SURG CYTO XM 1: CPT

## 2022-06-15 PROCEDURE — 74011000250 HC RX REV CODE- 250: Performed by: STUDENT IN AN ORGANIZED HEALTH CARE EDUCATION/TRAINING PROGRAM

## 2022-06-15 PROCEDURE — 71045 X-RAY EXAM CHEST 1 VIEW: CPT

## 2022-06-15 PROCEDURE — 74011250636 HC RX REV CODE- 250/636: Performed by: STUDENT IN AN ORGANIZED HEALTH CARE EDUCATION/TRAINING PROGRAM

## 2022-06-15 PROCEDURE — 88341 IMHCHEM/IMCYTCHM EA ADD ANTB: CPT

## 2022-06-15 PROCEDURE — 88305 TISSUE EXAM BY PATHOLOGIST: CPT

## 2022-06-15 PROCEDURE — 88342 IMHCHEM/IMCYTCHM 1ST ANTB: CPT

## 2022-06-15 RX ORDER — LIDOCAINE HYDROCHLORIDE 10 MG/ML
10 INJECTION INFILTRATION; PERINEURAL ONCE
Status: COMPLETED | OUTPATIENT
Start: 2022-06-15 | End: 2022-06-15

## 2022-06-15 RX ORDER — SODIUM CHLORIDE 9 MG/ML
25 INJECTION, SOLUTION INTRAVENOUS CONTINUOUS
Status: DISCONTINUED | OUTPATIENT
Start: 2022-06-15 | End: 2022-06-15

## 2022-06-15 RX ORDER — MIDAZOLAM HYDROCHLORIDE 1 MG/ML
1-5 INJECTION, SOLUTION INTRAMUSCULAR; INTRAVENOUS
Status: DISCONTINUED | OUTPATIENT
Start: 2022-06-15 | End: 2022-06-15

## 2022-06-15 RX ORDER — FENTANYL CITRATE 50 UG/ML
100 INJECTION, SOLUTION INTRAMUSCULAR; INTRAVENOUS
Status: DISCONTINUED | OUTPATIENT
Start: 2022-06-15 | End: 2022-06-15

## 2022-06-15 RX ADMIN — MIDAZOLAM HYDROCHLORIDE 1 MG: 1 INJECTION, SOLUTION INTRAMUSCULAR; INTRAVENOUS at 10:13

## 2022-06-15 RX ADMIN — MIDAZOLAM HYDROCHLORIDE 1 MG: 1 INJECTION, SOLUTION INTRAMUSCULAR; INTRAVENOUS at 10:18

## 2022-06-15 RX ADMIN — SODIUM CHLORIDE 25 ML/HR: 9 INJECTION, SOLUTION INTRAVENOUS at 09:50

## 2022-06-15 RX ADMIN — LIDOCAINE HYDROCHLORIDE 10 ML: 10 INJECTION, SOLUTION EPIDURAL; INFILTRATION; INTRACAUDAL; PERINEURAL at 10:20

## 2022-06-15 RX ADMIN — FENTANYL CITRATE 25 MCG: 50 INJECTION, SOLUTION INTRAMUSCULAR; INTRAVENOUS at 10:13

## 2022-06-15 RX ADMIN — FENTANYL CITRATE 25 MCG: 50 INJECTION, SOLUTION INTRAMUSCULAR; INTRAVENOUS at 10:18

## 2022-06-15 RX ADMIN — MIDAZOLAM HYDROCHLORIDE 1 MG: 1 INJECTION, SOLUTION INTRAMUSCULAR; INTRAVENOUS at 10:25

## 2022-06-15 NOTE — ROUTINE PROCESS
Dr. Lara Ochoa into speak with pt and procedure explained in detail along with risks and benefits; opportunity for questions given to patient and answers provided. Consent obtained for CT guided right lung biopsy at this time.

## 2022-06-15 NOTE — DISCHARGE INSTRUCTIONS
[facility] (DP/SNF)  Special Procedures/Radiology Department    Radiologist:  Dr. Luster Osler     Date: 06/15/2022          Lung Biopsy Discharge Instruction    You may have an aching pain in the biopsy site tonight. Take Tylenol, as directed on the label, for pain or discomfort. Avoid ibuprofen (Advil, Motrin) and aspirin for the next 48 hours as these drugs may cause you to bleed. Resume your previous diet and follow the medication reconciliation form. Rest for the next 48 hours. No strenuous activity for the next 48 hours. You may notice some blood-tinged sputum when you cough. This is normal.  If you have any bright red blood, or if you cough up blood clots, call 911 and get to the nearest Emergency Room immediately. It may take up to 3 days for your test results to become available. Call your physician if you have not heard anything after 3 business days. If you have any questions or concerns, please call [phone] and ask to speak to the nurse on-call.

## 2022-06-15 NOTE — ROUTINE PROCESS
Pt remains free from pain. Chest xray completed and read by Dr. Mickle Duverney, pt is okay for discharge. Discharge instructions given and reviewed with pt and pt voices complete understanding of all instructions given. Pt taken out via wheelchair and discharged to home with daughters.

## 2022-06-15 NOTE — PROCEDURES
Interventional Radiology  Procedure Note        6/15/2022 10:49 AM    Patient: Colletta Eve     Informed consent obtained    Diagnosis: Right lung mass    Procedure(s): CT guided right lung mass biopsy    Specimens removed:  8x 18ga core samples    Complications: None    Primary Physician: Helena Reynolds MD    Recommendations: N/A    Discharge Disposition: CXR in 1hr    Full dictated report to follow    Helena Reynolds MD  Interventional Radiology  [facility], [employer].  10:49 AM, 6/15/2022

## 2022-06-15 NOTE — H&P
Radiology History and Physical    Patient: Payton Whaley 80 y.o. female       Chief Complaint: No chief complaint on file. History of Present Illness: 80year old woman with a right upper lobe cavitary mass presents for biopsy    History:    Past Medical History:   Diagnosis Date    Age-related osteoporosis without current pathological fracture 7/20/2020    Hips. 7/2020    Anxiety     panic attacks    Arthritis     Bilateral exudative age-related macular degeneration ([address] 12/20/2019    Cancer ([facility] [address] 2012    hepatic flexure cancer; resected    Chronic pain     mid & upper back, shouldet    GERD (gastroesophageal reflux disease)     controlled with med    H/o Lyme disease 2015    Hematuria     Hypercholesterolemia     Hypertension     Pneumonia     bronchitis 2011         pneumonia twice in life    Status post colonoscopy with polypectomy      Family History   Problem Relation Age of Onset    Heart Disease Mother     Kidney Disease Father     Seizures Father      Social History     Socioeconomic History    Marital status:      Spouse name: Not on file    Number of children: Not on file    Years of education: Not on file    Highest education level: Not on file   Occupational History    Not on file   Tobacco Use    Smoking status: Current Every Day Smoker     Packs/day: 0.50     Years: 56.00     Pack years: 28.00    Smokeless tobacco: Never Used   Vaping Use    Vaping Use: Never used   Substance and Sexual Activity    Alcohol use: No    Drug use: No    Sexual activity: Never   Other Topics Concern    Not on file   Social History Narrative    Not on file     Social Determinants of Health     Financial Resource Strain:     Difficulty of Paying Living Expenses: Not on file   Food Insecurity:     Worried About Running Out of Food in the Last Year: Not on file    [place] of Food in the Last Year: Not on file   Transportation Needs:     Lack of Transportation (Medical):  Not on file    Lack of Transportation (Non-Medical): Not on file   Physical Activity:     Days of Exercise per Week: Not on file    Minutes of Exercise per Session: Not on file   Stress:     Feeling of Stress : Not on file   Social Connections:     Frequency of Communication with Friends and Family: Not on file    Frequency of Social Gatherings with Friends and Family: Not on file    Attends Yazidi Services: Not on file    Active Member of [address] [place] [address] or Organizations: Not on file    Attends Club or Organization Meetings: Not on file    Marital Status: Not on file   Intimate Partner Violence:     Fear of Current or Ex-Partner: Not on file    Emotionally Abused: Not on file    Physically Abused: Not on file    Sexually Abused: Not on file   Housing Stability:     Unable to Pay for Housing in the Last Year: Not on file    Number of [place] in the Last Year: Not on file    Unstable Housing in the Last Year: Not on file       Allergies: Allergies   Allergen Reactions    Bactrim [Sulfamethoprim Ds] Other (comments)     Avoids due to mother's severe reaction- (has never had bactrim)    Penicillins Other (comments)     Severe headches    Sulfasalazine Drowsiness     Other reaction(s): Other (comments)  Avoids due to mother's severe reaction- (has never had bactrim)    Ciprofloxacin Nausea Only    Flagyl [Metronidazole] Other (comments)     Disoriented, unable to function normally       Current Medications:  Current Outpatient Medications   Medication Sig    metoprolol tartrate (LOPRESSOR) 50 mg tablet TAKE ONE-HALF TABLET BY MOUTH TWICE DAILY FOR FOR BLOOD PRESSURE    cetirizine (ZyrTEC) 10 mg tablet Take 1 Tablet by mouth daily.     FLUoxetine (PROzac) 20 mg capsule TAKE 1 CAPSULE BY MOUTH EVERY DAY    atorvastatin (LIPITOR) 40 mg tablet TAKE 1 TABLET BY MOUTH EVERY DAY FOR CHOLESTEROL    amLODIPine (NORVASC) 5 mg tablet TAKE 1 TABLET BY MOUTH EVERY DAY    traZODone (DESYREL) 50 mg tablet 1/2 tab at bedtime. May increase to 1 tab nightly    omeprazole (PRILOSEC OTC) 20 mg tablet Take 20 mg by mouth daily. Indications: gastroesophageal reflux disease    aspirin 81 mg tablet Take 81 mg by mouth daily (after lunch).  albuterol (ACCUNEB) 1.25 mg/3 mL nebu Take 3 mL by inhalation every four (4) hours as needed for Wheezing. (Patient not taking: Reported on 1/7/2022)    albuterol (ProAir HFA) 90 mcg/actuation inhaler INHALE 2 PUFFS BY MOUTH EVERY 4 HOURS AS NEEDED FOR COUGH/WHEEZE (Patient not taking: Reported on 6/15/2022)     Current Facility-Administered Medications   Medication Dose Route Frequency    midazolam (VERSED) injection 1-5 mg  1-5 mg IntraVENous Multiple    0.9% sodium chloride infusion  25 mL/hr IntraVENous CONTINUOUS    fentaNYL citrate (PF) injection 100 mcg  100 mcg IntraVENous Multiple    lidocaine (XYLOCAINE) 10 mg/mL (1 %) injection 10 mL  10 mL SubCUTAneous ONCE        Physical Exam:  Blood pressure (!) 150/81, pulse 80, temperature 98.1 °F (36.7 °C), resp. rate 22, height 5' 1\" (1.549 m), weight 77.1 kg (170 lb), SpO2 99 %, not currently breastfeeding. GENERAL: alert, cooperative, no distress, appears stated age,   LUNG: Nonlabored respiration on room air  HEART: regular rate and rhythm    Alerts:    Hospital Problems  Date Reviewed: 7/7/2017    None          Laboratory:    No results for input(s): HGB, HCT, WBC, PLT, INR, BUN, CREA, K, CRCLT, HGBEXT, HCTEXT, PLTEXT, INREXT in the last 72 hours. No lab exists for component: PTT, PT      Plan of Care/Planned Procedure:  Risks, benefits, and alternatives reviewed with patient and she agrees to proceed with the procedure. CT guided right lung biopsy    Deemed appropriate for moderate sedation with versed and fentanyl.     Anita Beavers MD  Interventional Radiology  [facility]  9:58 AM, 6/15/2022

## 2022-10-20 ENCOUNTER — TRANSCRIBE ORDER (OUTPATIENT)
Dept: SCHEDULING | Age: 82
End: 2022-10-20

## 2022-10-20 DIAGNOSIS — C34.11 MALIGNANT NEOPLASM OF UPPER LOBE OF RIGHT LUNG (HCC): Primary | ICD-10-CM

## 2022-10-29 NOTE — PROGRESS NOTES
Bea Pham is a 80 y.o. female, evaluated via audio-only technology on 1/7/2022 for Nasal Congestion (Phone Call only [phone]), Ear Fullness (Right ), and Cough (Non Productive )  . Assessment & Plan:   Diagnoses and all orders for this visit:    1. Chronic bronchitis, unspecified chronic bronchitis type (HCC)  -     doxycycline (VIBRAMYCIN) 100 mg capsule; Take 1 Capsule by mouth two (2) times a day for 10 days. -     predniSONE (DELTASONE) 20 mg tablet; Take 60 mg by mouth daily for 3 days, THEN 40 mg daily for 3 days, THEN 20 mg daily for 3 days. 2. Essential hypertension  -     metoprolol tartrate (LOPRESSOR) 50 mg tablet; TAKE 1/2 TABLET BY MOUTH 2 TIMES A DAY FOR BLOOD PRESSURE      [facility] rx  12empi  Subjective:     Five days of cough and wheezing with chest tightness. No fever. Has had COVID booster. Prior to Admission medications    Medication Sig Start Date End Date Taking? Authorizing Provider   doxycycline (VIBRAMYCIN) 100 mg capsule Take 1 Capsule by mouth two (2) times a day for 10 days. 1/7/22 1/17/22 Yes Adalid Judge MD   predniSONE (DELTASONE) 20 mg tablet Take 60 mg by mouth daily for 3 days, THEN 40 mg daily for 3 days, THEN 20 mg daily for 3 days. 1/7/22 1/16/22 Yes Adalid Judge MD   metoprolol tartrate (LOPRESSOR) 50 mg tablet TAKE 1/2 TABLET BY MOUTH 2 TIMES A DAY FOR BLOOD PRESSURE 1/7/22  Yes Adalid Judge MD   FLUoxetine (PROzac) 20 mg capsule TAKE 1 CAPSULE BY MOUTH EVERY DAY 10/8/21  Yes Adalid Judge MD   atorvastatin (LIPITOR) 40 mg tablet TAKE 1 TABLET BY MOUTH EVERY DAY FOR CHOLESTEROL 1/22/21  Yes Adalid Judge MD   amLODIPine (NORVASC) 5 mg tablet TAKE 1 TABLET BY MOUTH EVERY DAY 1/22/21  Yes Adalid Judge MD   albuterol (ProAir HFA) 90 mcg/actuation inhaler INHALE 2 PUFFS BY MOUTH EVERY 4 HOURS AS NEEDED FOR COUGH/WHEEZE 6/24/20  Yes Provider, Historical   traZODone (DESYREL) 50 mg tablet 1/2 tab at bedtime.  May increase to 1 tab nightly 6/16/20  Yes Junior Rj MD   omeprazole (PRILOSEC OTC) 20 mg tablet Take 20 mg by mouth daily. Indications: gastroesophageal reflux disease   Yes Provider, Historical   aspirin 81 mg tablet Take 81 mg by mouth daily (after lunch). Yes Provider, Historical   albuterol (ACCUNEB) 1.25 mg/3 mL nebu Take 3 mL by inhalation every four (4) hours as needed for Wheezing. Patient not taking: Reported on 1/7/2022 10/4/21   Junior Rj MD   hydrOXYzine HCL (ATARAX) 25 mg tablet Take 1 Tablet by mouth nightly as needed for Anxiety. Indications: anxious  Patient not taking: Reported on 8/10/2021 6/25/21   Ann Hsieh PA-C   magnesium citrate solution Drink entire bottle. If you have not had a large amount of stool output within an hour, drink the second bottle. Patient not taking: Reported on 8/10/2021 3/23/21   Ely Kuhn MD   polyethylene glycol (Miralax) 17 gram/dose powder Take 17 g by mouth daily. 1 tablespoon with 8 oz of water daily  Patient not taking: Reported on 8/10/2021 3/23/21   Ely Kuhn MD   alendronate (FOSAMAX) 70 mg tablet Take 1 Tab by mouth every seven (7) days. Take in am with plain 8-12 oz plain water on empty stomach. Do not eat,drink, or lie down for 30 minutes after taking. Patient not taking: Reported on 1/7/2022 7/20/20   Junior Rj MD     Patient Active Problem List   Diagnosis Code    Cancer of transverse colon [facility]) C18.4    History of colon cancer, stage III Z85.038    Diverticulosis of sigmoid colon K57.30    Hypercholesterolemia E78.00    Hypertension I10    Encounter for colonoscopy due to history of colon cancer Z12.11, Z85.038    Bilateral exudative age-related macular degeneration ([facility] [address] H35.3230    Anxiety and depression F41.9, F32. A    Age-related osteoporosis without current pathological fracture M81.0     Current Outpatient Medications   Medication Sig Dispense Refill    doxycycline (VIBRAMYCIN) 100 mg capsule Take 1 Capsule by mouth two (2) times a day for 10 days. 20 Capsule 0    predniSONE (DELTASONE) 20 mg tablet Take 60 mg by mouth daily for 3 days, THEN 40 mg daily for 3 days, THEN 20 mg daily for 3 days. 18 Tablet 0    metoprolol tartrate (LOPRESSOR) 50 mg tablet TAKE 1/2 TABLET BY MOUTH 2 TIMES A DAY FOR BLOOD PRESSURE 90 Tablet 0    FLUoxetine (PROzac) 20 mg capsule TAKE 1 CAPSULE BY MOUTH EVERY DAY 90 Capsule 1    atorvastatin (LIPITOR) 40 mg tablet TAKE 1 TABLET BY MOUTH EVERY DAY FOR CHOLESTEROL 90 Tab 0    amLODIPine (NORVASC) 5 mg tablet TAKE 1 TABLET BY MOUTH EVERY DAY 90 Tab 0    albuterol (ProAir HFA) 90 mcg/actuation inhaler INHALE 2 PUFFS BY MOUTH EVERY 4 HOURS AS NEEDED FOR COUGH/WHEEZE      traZODone (DESYREL) 50 mg tablet 1/2 tab at bedtime. May increase to 1 tab nightly 45 Tab 1    omeprazole (PRILOSEC OTC) 20 mg tablet Take 20 mg by mouth daily. Indications: gastroesophageal reflux disease      aspirin 81 mg tablet Take 81 mg by mouth daily (after lunch).  albuterol (ACCUNEB) 1.25 mg/3 mL nebu Take 3 mL by inhalation every four (4) hours as needed for Wheezing. (Patient not taking: Reported on 1/7/2022) 25 Each 5    hydrOXYzine HCL (ATARAX) 25 mg tablet Take 1 Tablet by mouth nightly as needed for Anxiety. Indications: anxious (Patient not taking: Reported on 8/10/2021) 30 Tablet 0    magnesium citrate solution Drink entire bottle. If you have not had a large amount of stool output within an hour, drink the second bottle. (Patient not taking: Reported on 8/10/2021) 2 Bottle 0    polyethylene glycol (Miralax) 17 gram/dose powder Take 17 g by mouth daily. 1 tablespoon with 8 oz of water daily (Patient not taking: Reported on 8/10/2021) 235 g 0    alendronate (FOSAMAX) 70 mg tablet Take 1 Tab by mouth every seven (7) days. Take in am with plain 8-12 oz plain water on empty stomach. Do not eat,drink, or lie down for 30 minutes after taking.  (Patient not taking: Reported on 1/7/2022) 13 Tab 3     Allergies   Allergen Reactions    Bactrim [Sulfamethoprim Ds] Other (comments)     Avoids due to mother's severe reaction- (has never had bactrim)    Penicillins Other (comments)     Severe headches    Sulfasalazine Drowsiness     Other reaction(s): Other (comments)  Avoids due to mother's severe reaction- (has never had bactrim)    Ciprofloxacin Nausea Only    Flagyl [Metronidazole] Other (comments)     Disoriented, unable to function normally       ROS    Patient-Reported Vitals 1/7/2022   Patient-Reported Weight -   Patient-Reported Height -   Patient-Reported Pulse -   Patient-Reported Temperature 99.0   Patient-Reported SpO2 -   Patient-Reported Systolic  216   Patient-Reported Diastolic 80       Neela Denton, who was evaluated through a patient-initiated, synchronous (real-time) audio only encounter, and/or her healthcare decision maker, is aware that it is a billable service, with coverage as determined by her insurance carrier. She provided verbal consent to proceed: Yes. She has not had a related appointment within my department in the past 7 days or scheduled within the next 24 hours. On this date 01/07/2022 I have spent 15 minutes reviewing previous notes, test results and face to face (virtual) with the patient discussing the diagnosis and importance of compliance with the treatment plan as well as documenting on the day of the visit. Oh Villaseñor MD 1. Have you been to the ER, urgent care clinic since your last visit? Hospitalized since your last visit? No    2. Have you seen or consulted any other health care providers outside of the [address] [place] [address] since your last visit? Include any pap smears or colon screening.  No show

## 2022-11-29 ENCOUNTER — HOSPITAL ENCOUNTER (OUTPATIENT)
Dept: CT IMAGING | Age: 82
Discharge: HOME | End: 2022-11-29
Attending: INTERNAL MEDICINE
Payer: MEDICARE

## 2022-11-29 DIAGNOSIS — C34.11 MALIGNANT NEOPLASM OF UPPER LOBE OF RIGHT LUNG (HCC): ICD-10-CM

## 2022-11-29 PROCEDURE — 71250 CT THORAX DX C-: CPT

## 2022-12-09 ENCOUNTER — TRANSCRIBE ORDER (OUTPATIENT)
Dept: SCHEDULING | Age: 82
End: 2022-12-09

## 2022-12-09 DIAGNOSIS — C34.11 MALIGNANT NEOPLASM OF UPPER LOBE OF RIGHT LUNG (HCC): Primary | ICD-10-CM

## 2023-04-22 DIAGNOSIS — C34.11 MALIGNANT NEOPLASM OF UPPER LOBE OF RIGHT LUNG (HCC): Primary | ICD-10-CM

## 2023-05-11 ENCOUNTER — HOSPITAL ENCOUNTER (OUTPATIENT)
Facility: HOSPITAL | Age: 83
Discharge: HOME | End: 2023-05-11
Payer: MEDICARE

## 2023-05-11 DIAGNOSIS — C34.11 MALIGNANT NEOPLASM OF UPPER LOBE OF RIGHT LUNG (HCC): ICD-10-CM

## 2023-05-11 PROCEDURE — 71250 CT THORAX DX C-: CPT

## 2023-07-17 ENCOUNTER — APPOINTMENT (OUTPATIENT)
Facility: HOSPITAL | Age: 83
End: 2023-07-17
Payer: MEDICARE

## 2023-07-17 ENCOUNTER — HOSPITAL ENCOUNTER (EMERGENCY)
Facility: HOSPITAL | Age: 83
Discharge: HOME | End: 2023-07-17
Attending: EMERGENCY MEDICINE
Payer: MEDICARE

## 2023-07-17 VITALS
HEIGHT: 62 IN | TEMPERATURE: 97.9 F | BODY MASS INDEX: 31.65 KG/M2 | OXYGEN SATURATION: 96 % | WEIGHT: 172 LBS | SYSTOLIC BLOOD PRESSURE: 128 MMHG | HEART RATE: 81 BPM | DIASTOLIC BLOOD PRESSURE: 62 MMHG | RESPIRATION RATE: 17 BRPM

## 2023-07-17 DIAGNOSIS — M47.816 OSTEOARTHRITIS OF LUMBAR SPINE, UNSPECIFIED SPINAL OSTEOARTHRITIS COMPLICATION STATUS: ICD-10-CM

## 2023-07-17 DIAGNOSIS — M16.9 OSTEOARTHRITIS OF HIP, UNSPECIFIED LATERALITY, UNSPECIFIED OSTEOARTHRITIS TYPE: ICD-10-CM

## 2023-07-17 DIAGNOSIS — M54.10 RADICULOPATHY, UNSPECIFIED SPINAL REGION: Primary | ICD-10-CM

## 2023-07-17 LAB
APPEARANCE UR: CLEAR
BACTERIA URNS QL MICRO: NEGATIVE /HPF
BILIRUB UR QL: NEGATIVE
COLOR UR: NORMAL
ECHO BSA: 1.85 M2
EPITH CASTS URNS QL MICRO: NORMAL /LPF
GLUCOSE UR STRIP.AUTO-MCNC: NEGATIVE MG/DL
HGB UR QL STRIP: NEGATIVE
KETONES UR QL STRIP.AUTO: NEGATIVE MG/DL
LEUKOCYTE ESTERASE UR QL STRIP.AUTO: NEGATIVE
NITRITE UR QL STRIP.AUTO: NEGATIVE
PH UR STRIP: 7 (ref 5–8)
PROT UR STRIP-MCNC: NEGATIVE MG/DL
RBC #/AREA URNS HPF: NORMAL /HPF (ref 0–5)
SP GR UR REFRACTOMETRY: <1.005 (ref 1–1.03)
URINE CULTURE IF INDICATED: NORMAL
UROBILINOGEN UR QL STRIP.AUTO: 0.2 EU/DL (ref 0.2–1)
WBC URNS QL MICRO: NORMAL /HPF (ref 0–4)

## 2023-07-17 PROCEDURE — 73562 X-RAY EXAM OF KNEE 3: CPT

## 2023-07-17 PROCEDURE — 81001 URINALYSIS AUTO W/SCOPE: CPT

## 2023-07-17 PROCEDURE — 6370000000 HC RX 637 (ALT 250 FOR IP): Performed by: EMERGENCY MEDICINE

## 2023-07-17 PROCEDURE — 73502 X-RAY EXAM HIP UNI 2-3 VIEWS: CPT

## 2023-07-17 PROCEDURE — 93971 EXTREMITY STUDY: CPT

## 2023-07-17 PROCEDURE — 72100 X-RAY EXAM L-S SPINE 2/3 VWS: CPT

## 2023-07-17 PROCEDURE — 99284 EMERGENCY DEPT VISIT MOD MDM: CPT | Performed by: EMERGENCY MEDICINE

## 2023-07-17 RX ORDER — LIDOCAINE 50 MG/G
1 PATCH TOPICAL DAILY
Qty: 10 PATCH | Refills: 0 | Status: SHIPPED | OUTPATIENT
Start: 2023-07-17 | End: 2023-07-27

## 2023-07-17 RX ORDER — CYCLOBENZAPRINE HCL 10 MG
10 TABLET ORAL ONCE
Status: COMPLETED | OUTPATIENT
Start: 2023-07-17 | End: 2023-07-17

## 2023-07-17 RX ORDER — IBUPROFEN 600 MG/1
600 TABLET ORAL
Status: COMPLETED | OUTPATIENT
Start: 2023-07-17 | End: 2023-07-17

## 2023-07-17 RX ORDER — CYCLOBENZAPRINE HCL 10 MG
10 TABLET ORAL 3 TIMES DAILY PRN
Qty: 21 TABLET | Refills: 0 | Status: SHIPPED | OUTPATIENT
Start: 2023-07-17 | End: 2023-07-27

## 2023-07-17 RX ADMIN — CYCLOBENZAPRINE 10 MG: 10 TABLET, FILM COATED ORAL at 13:48

## 2023-07-17 RX ADMIN — IBUPROFEN 600 MG: 600 TABLET, FILM COATED ORAL at 13:49

## 2023-07-17 ASSESSMENT — PAIN - FUNCTIONAL ASSESSMENT
PAIN_FUNCTIONAL_ASSESSMENT: 0-10
PAIN_FUNCTIONAL_ASSESSMENT: 0-10

## 2023-07-17 ASSESSMENT — LIFESTYLE VARIABLES
HOW OFTEN DO YOU HAVE A DRINK CONTAINING ALCOHOL: NEVER
HOW MANY STANDARD DRINKS CONTAINING ALCOHOL DO YOU HAVE ON A TYPICAL DAY: PATIENT DOES NOT DRINK

## 2023-07-17 ASSESSMENT — PAIN SCALES - GENERAL
PAINLEVEL_OUTOF10: 3
PAINLEVEL_OUTOF10: 2
PAINLEVEL_OUTOF10: 3

## 2023-07-17 ASSESSMENT — PAIN DESCRIPTION - ORIENTATION: ORIENTATION: RIGHT

## 2023-07-17 ASSESSMENT — PAIN DESCRIPTION - LOCATION: LOCATION: LEG;HIP

## 2023-07-17 NOTE — ED TRIAGE NOTES
Pt arrived by POV for lower back and buttocks pain. Pt reports she is having pain from her lower back with radiation down her leg, pt reports when laying down the pain radiated down her from right hip into her groin. Pt reports this started a week ago. Pt is awake alert and oriented X 4, ,pt educated on ER flow. This writer apologized for any delay that may occur, pt and/or family educated on acuity of the unit at this time.   Pt placed back in waiting room at this time

## 2023-07-24 NOTE — ED PROVIDER NOTES
capsule Take 1 tablet by mouth daily      metoprolol tartrate (LOPRESSOR) 50 MG tablet TAKE ONE-HALF TABLET BY MOUTH TWICE DAILY FOR FOR BLOOD PRESSURE      omeprazole (PRILOSEC OTC) 20 MG tablet Take 20 mg by mouth daily      traZODone (DESYREL) 50 MG tablet 1/2 tab at bedtime. May increase to 1 tab nightly         Past History     Past Medical History:  Past Medical History:   Diagnosis Date    Age-related osteoporosis without current pathological fracture 7/20/2020    Hips. 7/2020    Anxiety     panic attacks    Arthritis     Bilateral exudative age-related macular degeneration ([address]) 12/20/2019    Cancer ([address]) 2012    hepatic flexure cancer; resected    Chronic pain     mid & upper back, shouldet    GERD (gastroesophageal reflux disease)     controlled with med    H/o Lyme disease 2015    Hematuria     Hypercholesterolemia     Hypertension     Pneumonia     bronchitis 2011         pneumonia twice in life    Status post colonoscopy with polypectomy        Past Surgical History:  Past Surgical History:   Procedure Laterality Date    COLONOSCOPY N/A 9/21/2016    COLONOSCOPY performed by Crissy Cunningham MD at [facility] AMBULATORY OR    CT NEEDLE BIOPSY LUNG PERCUTANEOUS  6/15/2022    CT NEEDLE BIOPSY LUNG PERCUTANEOUS 6/15/2022 [facility] RAD CT    GI      partial colectomy    OTHER SURGICAL HISTORY      hemorrhoidectomy    TONSILLECTOMY      TUBAL LIGATION  1971       Family History:  Family History   Problem Relation Age of Onset    Heart Disease Mother     Seizures Father     Kidney Disease Father        Social History:  Social History     Tobacco Use    Smoking status: Every Day     Packs/day: 0.50     Types: Cigarettes    Smokeless tobacco: Never   Substance Use Topics    Alcohol use: No    Drug use: No       Allergies:   Allergies   Allergen Reactions    Penicillins Other (See Comments)     Severe headches    Sulfa Antibiotics Other (See Comments)     Avoids due to mother's severe reaction- (has never had bactrim)

## 2023-09-26 NOTE — MR AVS SNAPSHOT
Visit Information Date & Time Provider Department Dept. Phone Encounter #  
 7/7/2017  1:30 PM Girish Reynolds PA-C [address] [id number] Upcoming Health Maintenance Date Due DTaP/Tdap/Td series (1 - Tdap) 10/24/1961 GLAUCOMA SCREENING Q2Y 10/24/2005 OSTEOPOROSIS SCREENING (DEXA) 10/24/2005 Pneumococcal 65+ High/Highest Risk (2 of 2 - PCV13) 3/28/2009 INFLUENZA AGE 9 TO ADULT 8/1/2017 MEDICARE YEARLY EXAM 7/8/2018 Allergies as of 7/7/2017  Review Complete On: 7/7/2017 By: Girish Reynolds PA-C Severity Noted Reaction Type Reactions Bactrim [Sulfamethoprim Ds] Medium 06/08/2012   Intolerance Other (comments) Avoids due to mother's severe reaction- (has never had bactrim) Penicillins Medium 06/08/2012   Side Effect Other (comments) Severe headches Ciprofloxacin Low 06/08/2012   Side Effect Nausea Only Flagyl [Metronidazole] Low 06/08/2012   Side Effect Other (comments) Disoriented, unable to function normally Current Immunizations  Reviewed on 7/6/2017 Name Date Influenza Vaccine 11/19/2013 Pneumococcal Polysaccharide (PPSV-23) 3/28/2008 Not reviewed this visit You Were Diagnosed With   
  
 Codes Comments Candidal intertrigo    -  Primary ICD-10-CM: B37.2 ICD-9-CM: 112.3 Medicare annual wellness visit, subsequent     ICD-10-CM: Z00.00 ICD-9-CM: V70.0 Vitals BP Pulse Temp Resp Height(growth percentile) 125/78 (BP 1 Location: Right arm, BP Patient Position: Sitting) 78 96.7 °F (35.9 °C) (Temporal) 20 5' 4\" (1.626 m) Weight(growth percentile) SpO2 BMI OB Status Smoking Status 181 lb (82.1 kg) 97% 31.07 kg/m2 Postmenopausal Current Every Day Smoker BMI and BSA Data Body Mass Index Body Surface Area 31.07 kg/m 2 1.93 m 2 Preferred Pharmacy Pharmacy Name Phone [facility] [place] [address] [phone] Your Updated Medication List  
  
   
This list is accurate as of: 7/7/17  2:04 PM.  Always use your most recent med list. ADVIL 200 mg tablet Generic drug:  ibuprofen Take  by mouth. amLODIPine 5 mg tablet Commonly known as:  Sherrye Acron TAKE ONE TABLET BY MOUTH EVERY DAY  
  
 aspirin 81 mg tablet Take 81 mg by mouth daily (after lunch). atorvastatin 40 mg tablet Commonly known as:  LIPITOR  
TAKE ONE TABLET BY MOUTH EVERY DAY FOR CHOLESTEROL  
  
 cyclobenzaprine 10 mg tablet Commonly known as:  FLEXERIL Take one twice a day as needed for back/neck spasms FLUoxetine 20 mg tablet Commonly known as:  PROzac Take 1 Tab by mouth daily. fluticasone 50 mcg/actuation nasal spray Commonly known as:  Yuval Barrett Use 2 sprays in ea nostril once a day  
  
 gabapentin 100 mg capsule Commonly known as:  NEURONTIN Take 1-6 capsules nightly for pain  
  
 metoprolol tartrate 50 mg tablet Commonly known as:  LOPRESSOR  
TAKE 1/2 TABLET BY MOUTH 2 TIMES A DAY FOR BLOOD PRESSURE  
  
 nystatin topical cream  
Commonly known as:  MYCOSTATIN Apply  to affected area two (2) times a day. PriLOSEC OTC 20 mg tablet Generic drug:  omeprazole Take 40 mg by mouth daily. Indications: GASTROESOPHAGEAL REFLUX Prescriptions Sent to Pharmacy Refills  
 nystatin (MYCOSTATIN) topical cream 0 Sig: Apply  to affected area two (2) times a day. Class: Normal  
 Pharmacy: [address] #: [phone] Route: Topical  
  
Patient Instructions Schedule of Personalized Health Plan (Provide Copy to Patient) The best way to stay healthy is to live a healthy lifestyle. A healthy lifestyle includes regular exercise, eating a well-balanced diet, keeping a healthy weight and not smoking.  
 
Regular physical exams and screening tests are another important way to take care of yourself. Preventive exams provided by health care providers can find health problems early when treatment works best and can keep you from getting certain diseases or illnesses. Preventive services include exams, lab tests, screenings, shots, monitoring and information to help you take care of your own health. All people over 65 should have a pneumonia shot. Pneumonia shots are usually only needed once in a lifetime unless your doctor decides differently. All people over 65 should have a yearly flu shot. People over 65 are at medium to high risk for Hepatitis B. Three shots are needed for complete protection. In addition to your physical exam, some screening tests are recommended: 
 
Bone mass measurement (dexa scan) is recommended every two years Diabetes Mellitus screening is recommended every year. Glaucoma is an eye disease caused by high pressure in the eye. An eye exam is recommended every year. Cardiovascular screening tests that check your cholesterol and other blood fat (lipid) levels are recommended every five years. Colorectal Cancer screening tests help to find pre-cancerous polyps (growths in the colon) so they can be removed before they turn into cancer. Tests ordered for screening depend on your personal and family history risk factors. Screening for Breast Cancer is recommended yearly with a mammogram. 
 
Screening for Cervical Cancer is recommended every two years (annually for certain risk factors, such as previous history of STD or abnormal PAP in past 7 years), with a Pelvic Exam with PAP Here is a list of your current Health Maintenance items with a due date: 
Health Maintenance Topic Date Due  
 DTaP/Tdap/Td series (1 - Tdap) 10/24/1961  GLAUCOMA SCREENING Q2Y  10/24/2005  OSTEOPOROSIS SCREENING (DEXA)  10/24/2005  Pneumococcal 65+ High/Highest Risk (2 of 2 - PCV13) 03/28/2009  MEDICARE YEARLY EXAM  05/06/2017  INFLUENZA AGE 9 TO ADULT  08/01/2017  ZOSTER VACCINE AGE 60>  Addressed Patient given ACP to look over. Candidiasis: Care Instructions Your Care Instructions Candidiasis (say \"byu-yos-CB-uh-raquel\") is a yeast infection. Yeast normally lives in your body. But it can cause problems if your body's defenses don't work as they should. Some medicines can increase your chance of getting a yeast infection. These include antibiotics, steroids, and cancer drugs. And some diseases like AIDS and diabetes can make you more likely to get yeast infections. There are different types of yeast infections. Eben Stevenson is a yeast infection in the mouth. It usually occurs in people with weak immune systems. It causes white patches inside the mouth and throat. Yeast infections of the skin usually occur in skin folds where the skin stays moist. They cause red, oozing patches on your skin. Babies can get these infections under the diaper. People who often wear gloves can get them on their hands. Many women get vaginal yeast infections. They are most common when women take antibiotics. These infections can cause the vagina to itch and burn. They also cause white discharge that looks like cottage cheese. In rare cases, yeast infects the blood. This can cause serious disease. This kind of infection is treated with medicine given through a needle into a vein (IV). After you start treatment, a yeast infection usually goes away quickly. But if your immune system is weak, the infection may come back. Tell your doctor if you get yeast infections often. Follow-up care is a key part of your treatment and safety. Be sure to make and go to all appointments, and call your doctor if you are having problems. It's also a good idea to know your test results and keep a list of the medicines you take. How can you care for yourself at home? · Take your medicines exactly as prescribed.  Call your doctor if you think you are having a problem with your medicine. · Use antibiotics only as directed by your doctor. · Eat yogurt with live cultures. It has bacteria called lactobacillus. It may help prevent some types of yeast infections. · Keep your skin clean and dry. Put powder on moist places. · If you are using a cream or suppository to treat a vaginal yeast infection, don't use condoms or a diaphragm. Use a different type of birth control. · Eat a healthy diet and get regular exercise. This will help keep your immune system strong. When should you call for help? Call your doctor now or seek immediate medical care if: 
· You have a fever. · You are pregnant and have signs of a vaginal or urinary tract infection such as: ¨ Severe itching in your vagina. ¨ Pain during sex or when you urinate. ¨ Unusual discharge from your vagina. ¨ A frequent urge to urinate. ¨ Urine that is cloudy or smells bad. Watch closely for changes in your health, and be sure to contact your doctor if: 
· You do not get better as expected. Where can you learn more? Go to http://christel-renuka.info/. Enter P358 in the search box to learn more about \"Candidiasis: Care Instructions. \" Current as of: October 13, 2016 Content Version: 11.3 © 6502-5624 [employer]. Care instructions adapted under license by [employer] (which disclaims liability or warranty for this information). If you have questions about a medical condition or this instruction, always ask your healthcare professional. [place] [address] any warranty or liability for your use of this information. Well Visit, Over 72: Care Instructions Your Care Instructions Physical exams can help you stay healthy. Your doctor has checked your overall health and may have suggested ways to take good care of yourself. He or she also may have recommended tests.  At home, you can help prevent illness with healthy eating, regular exercise, and other steps. Follow-up care is a key part of your treatment and safety. Be sure to make and go to all appointments, and call your doctor if you are having problems. It's also a good idea to know your test results and keep a list of the medicines you take. How can you care for yourself at home? · Reach and stay at a healthy weight. This will lower your risk for many problems, such as obesity, diabetes, heart disease, and high blood pressure. · Get at least 30 minutes of exercise on most days of the week. Walking is a good choice. You also may want to do other activities, such as running, swimming, cycling, or playing tennis or team sports. · Do not smoke. Smoking can make health problems worse. If you need help quitting, talk to your doctor about stop-smoking programs and medicines. These can increase your chances of quitting for good. · Protect your skin from too much sun. When you're outdoors from 10 a.m. to 4 p.m., stay in the shade or cover up with clothing and a hat with a wide brim. Wear sunglasses that block UV rays. Even when it's cloudy, put broad-spectrum sunscreen (SPF 30 or higher) on any exposed skin. · See a dentist one or two times a year for checkups and to have your teeth cleaned. · Wear a seat belt in the car. · Limit alcohol to 2 drinks a day for men and 1 drink a day for women. Too much alcohol can cause health problems. Follow your doctor's advice about when to have certain tests. These tests can spot problems early. For men and women · Cholesterol. Your doctor will tell you how often to have this done based on your overall health and other things that can increase your risk for heart attack and stroke. · Blood pressure. Have your blood pressure checked during a routine doctor visit. Your doctor will tell you how often to check your blood pressure based on your age, your blood pressure results, and other factors. · Diabetes. Ask your doctor whether you should have tests for diabetes. · Vision. Experts recommend that you have yearly exams for glaucoma and other age-related eye problems. · Hearing. Tell your doctor if you notice any change in your hearing. You can have tests to find out how well you hear. · Colon cancer tests. Keep having colon cancer tests as your doctor recommends. You can have one of several types of tests. · Heart attack and stroke risk. At least every 4 to 6 years, you should have your risk for heart attack and stroke assessed. Your doctor uses factors such as your age, blood pressure, cholesterol, and whether you smoke or have diabetes to show what your risk for a heart attack or stroke is over the next 10 years. · Osteoporosis. Talk to your doctor about whether you should have a bone density test to find out whether you have thinning bones. Also ask your doctor about whether you should take calcium and vitamin D supplements. For women · Pap test and pelvic exam. You may no longer need a Pap test. Talk with your doctor about whether to stop or continue to have Pap tests. · Breast exam and mammogram. Ask how often you should have a mammogram, which is an X-ray of your breasts. A mammogram can spot breast cancer before it can be felt and when it is easiest to treat. · Thyroid disease. Talk to your doctor about whether to have your thyroid checked as part of a regular physical exam. Women have an increased chance of a thyroid problem. For men · Prostate exam. Talk to your doctor about whether you should have a blood test (called a PSA test) for prostate cancer. Experts disagree on whether men should have this test. Some experts recommend that you discuss the benefits and risks of the test with your doctor. · Abdominal aortic aneurysm. Ask your doctor whether you should have a test to check for an aneurysm.  You may need a test if you ever smoked or if your parent, brother, sister, or child has had an aneurysm. When should you call for help? Watch closely for changes in your health, and be sure to contact your doctor if you have any problems or symptoms that concern you. Where can you learn more? Go to http://christel-renuka.info/. Enter P974 in the search box to learn more about \"Well Visit, Over 65: Care Instructions. \" Current as of: July 19, 2016 Content Version: 11.3 © 7895-8990 [employer]. Care instructions adapted under license by [email or web address] (which disclaims liability or warranty for this information). If you have questions about a medical condition or this instruction, always ask your healthcare professional. [address] any warranty or liability for your use of this information. Please provide this summary of care documentation to your next provider. Your primary care clinician is listed as Donny Emmanuel. If you have any questions after today's visit, please call [phone]. no

## 2023-09-29 ENCOUNTER — HOSPITAL ENCOUNTER (OUTPATIENT)
Facility: HOSPITAL | Age: 83
End: 2023-09-29
Payer: MEDICARE

## 2023-09-29 DIAGNOSIS — H93.8X1 AUDIBLE HEARTBEAT IN RIGHT EAR: ICD-10-CM

## 2023-09-29 PROCEDURE — 93880 EXTRACRANIAL BILAT STUDY: CPT

## 2023-10-01 LAB
VAS LEFT CCA DIST EDV: 16.4 CM/S
VAS LEFT CCA DIST PSV: 59.2 CM/S
VAS LEFT CCA PROX EDV: 14.2 CM/S
VAS LEFT CCA PROX PSV: 48.6 CM/S
VAS LEFT ECA EDV: 7.69 CM/S
VAS LEFT ECA PSV: 48.9 CM/S
VAS LEFT ICA DIST EDV: 13.4 CM/S
VAS LEFT ICA DIST PSV: 36.4 CM/S
VAS LEFT ICA MID EDV: 12.3 CM/S
VAS LEFT ICA MID PSV: 35.7 CM/S
VAS LEFT ICA/CCA PSV: 0.61 NO UNITS
VAS LEFT VERTEBRAL EDV: 9.52 CM/S
VAS LEFT VERTEBRAL PSV: 31.4 CM/S
VAS RIGHT CCA DIST EDV: 9.8 CM/S
VAS RIGHT CCA DIST PSV: 32.8 CM/S
VAS RIGHT CCA PROX EDV: 8.5 CM/S
VAS RIGHT CCA PROX PSV: 33.2 CM/S
VAS RIGHT ECA EDV: 11.3 CM/S
VAS RIGHT ECA PSV: 69.4 CM/S
VAS RIGHT ICA DIST EDV: 23.9 CM/S
VAS RIGHT ICA DIST PSV: 49.7 CM/S
VAS RIGHT ICA MID EDV: 14.2 CM/S
VAS RIGHT ICA MID PSV: 39.6 CM/S
VAS RIGHT ICA PROX EDV: 14.7 CM/S
VAS RIGHT ICA PROX PSV: 35.5 CM/S
VAS RIGHT ICA/CCA PSV: 1.5 NO UNITS
VAS RIGHT VERTEBRAL EDV: 12.74 CM/S
VAS RIGHT VERTEBRAL PSV: 52.6 CM/S

## 2024-02-05 ENCOUNTER — TRANSCRIBE ORDERS (OUTPATIENT)
Facility: HOSPITAL | Age: 84
End: 2024-02-05

## 2024-02-05 DIAGNOSIS — C34.90 MALIGNANT NEOPLASM OF BRONCHUS AND LUNG (HCC): Primary | ICD-10-CM

## 2024-02-16 ENCOUNTER — HOSPITAL ENCOUNTER (OUTPATIENT)
Facility: HOSPITAL | Age: 84
End: 2024-02-16
Payer: MEDICARE

## 2024-02-16 DIAGNOSIS — C34.90 MALIGNANT NEOPLASM OF BRONCHUS AND LUNG (HCC): ICD-10-CM

## 2024-02-16 PROCEDURE — 70553 MRI BRAIN STEM W/O & W/DYE: CPT

## 2024-02-16 PROCEDURE — 6360000004 HC RX CONTRAST MEDICATION: Performed by: INTERNAL MEDICINE

## 2024-02-16 PROCEDURE — A9579 GAD-BASE MR CONTRAST NOS,1ML: HCPCS | Performed by: INTERNAL MEDICINE

## 2024-02-16 RX ADMIN — GADOTERIDOL 16 ML: 279.3 INJECTION, SOLUTION INTRAVENOUS at 11:39

## 2024-08-05 ENCOUNTER — HOSPITAL ENCOUNTER (OUTPATIENT)
Facility: HOSPITAL | Age: 84
Discharge: HOME | End: 2024-08-08
Payer: MEDICARE

## 2024-08-05 DIAGNOSIS — C34.11 MALIGNANT NEOPLASM OF UPPER LOBE OF RIGHT LUNG (HCC): ICD-10-CM

## 2024-08-05 LAB
BUN SERPL-MCNC: 10 MG/DL (ref 6–20)
CREAT SERPL-MCNC: 0.76 MG/DL (ref 0.55–1.02)

## 2024-08-05 PROCEDURE — 6360000004 HC RX CONTRAST MEDICATION: Performed by: INTERNAL MEDICINE

## 2024-08-05 PROCEDURE — 82565 ASSAY OF CREATININE: CPT

## 2024-08-05 PROCEDURE — 84520 ASSAY OF UREA NITROGEN: CPT

## 2024-08-05 PROCEDURE — 36415 COLL VENOUS BLD VENIPUNCTURE: CPT

## 2024-08-05 PROCEDURE — 71260 CT THORAX DX C+: CPT

## 2024-08-05 RX ADMIN — IOPAMIDOL 80 ML: 755 INJECTION, SOLUTION INTRAVENOUS at 14:20

## 2024-08-14 ENCOUNTER — TRANSCRIBE ORDERS (OUTPATIENT)
Facility: HOSPITAL | Age: 84
End: 2024-08-14

## 2024-08-14 DIAGNOSIS — C34.11 MALIGNANT NEOPLASM OF UPPER LOBE OF RIGHT LUNG (HCC): Primary | ICD-10-CM

## 2024-08-14 DIAGNOSIS — Z85.038 HISTORY OF COLON CANCER: ICD-10-CM

## 2024-11-18 ENCOUNTER — HOSPITAL ENCOUNTER (OUTPATIENT)
Facility: HOSPITAL | Age: 84
Discharge: HOME | End: 2024-11-21
Payer: MEDICARE

## 2024-11-18 DIAGNOSIS — C34.11 MALIGNANT NEOPLASM OF UPPER LOBE OF RIGHT LUNG (HCC): ICD-10-CM

## 2024-11-18 DIAGNOSIS — Z85.038 HISTORY OF COLON CANCER: ICD-10-CM

## 2024-11-18 LAB
BUN SERPL-MCNC: 12 MG/DL (ref 6–20)
CREAT SERPL-MCNC: 0.97 MG/DL (ref 0.55–1.02)

## 2024-11-18 PROCEDURE — 82565 ASSAY OF CREATININE: CPT

## 2024-11-18 PROCEDURE — 36415 COLL VENOUS BLD VENIPUNCTURE: CPT

## 2024-11-18 PROCEDURE — 71260 CT THORAX DX C+: CPT

## 2024-11-18 PROCEDURE — 6360000004 HC RX CONTRAST MEDICATION: Performed by: INTERNAL MEDICINE

## 2024-11-18 PROCEDURE — 84520 ASSAY OF UREA NITROGEN: CPT

## 2024-11-18 RX ORDER — IOPAMIDOL 755 MG/ML
100 INJECTION, SOLUTION INTRAVASCULAR ONCE
Status: COMPLETED | OUTPATIENT
Start: 2024-11-18 | End: 2024-11-18

## 2024-11-18 RX ADMIN — IOPAMIDOL 100 ML: 755 INJECTION, SOLUTION INTRAVENOUS at 11:58

## 2025-03-12 ENCOUNTER — TRANSCRIBE ORDERS (OUTPATIENT)
Facility: HOSPITAL | Age: 85
End: 2025-03-12

## 2025-03-12 DIAGNOSIS — C34.11 MALIGNANT NEOPLASM OF UPPER LOBE OF RIGHT LUNG (HCC): Primary | ICD-10-CM

## 2025-03-13 ENCOUNTER — HOSPITAL ENCOUNTER (OUTPATIENT)
Facility: HOSPITAL | Age: 85
Discharge: HOME | End: 2025-03-16
Payer: MEDICARE

## 2025-03-13 DIAGNOSIS — C34.11 MALIGNANT NEOPLASM OF UPPER LOBE OF RIGHT LUNG (HCC): ICD-10-CM

## 2025-03-13 LAB
BUN SERPL-MCNC: 12 MG/DL (ref 6–20)
CREAT SERPL-MCNC: 0.88 MG/DL (ref 0.55–1.02)

## 2025-03-13 PROCEDURE — 36415 COLL VENOUS BLD VENIPUNCTURE: CPT

## 2025-03-13 PROCEDURE — 71260 CT THORAX DX C+: CPT

## 2025-03-13 PROCEDURE — 84520 ASSAY OF UREA NITROGEN: CPT

## 2025-03-13 PROCEDURE — 82565 ASSAY OF CREATININE: CPT

## 2025-03-13 PROCEDURE — 6360000004 HC RX CONTRAST MEDICATION: Performed by: INTERNAL MEDICINE

## 2025-03-13 RX ORDER — IOPAMIDOL 755 MG/ML
100 INJECTION, SOLUTION INTRAVASCULAR
Status: COMPLETED | OUTPATIENT
Start: 2025-03-13 | End: 2025-03-13

## 2025-03-13 RX ADMIN — IOPAMIDOL 80 ML: 755 INJECTION, SOLUTION INTRAVENOUS at 14:59

## 2025-04-18 ENCOUNTER — TRANSCRIBE ORDERS (OUTPATIENT)
Facility: HOSPITAL | Age: 85
End: 2025-04-18

## 2025-04-18 DIAGNOSIS — C34.11 MALIGNANT NEOPLASM OF UPPER LOBE OF RIGHT LUNG (HCC): Primary | ICD-10-CM

## 2025-04-23 ENCOUNTER — TRANSCRIBE ORDERS (OUTPATIENT)
Facility: HOSPITAL | Age: 85
End: 2025-04-23

## 2025-04-23 DIAGNOSIS — R19.4 ALTERED BOWEL HABITS: Primary | ICD-10-CM

## 2025-04-23 DIAGNOSIS — Z85.038 PERSONAL HISTORY OF COLON CANCER: ICD-10-CM

## 2025-05-22 ENCOUNTER — HOSPITAL ENCOUNTER (OUTPATIENT)
Facility: HOSPITAL | Age: 85
Discharge: HOME | End: 2025-05-25
Payer: MEDICARE

## 2025-05-22 DIAGNOSIS — Z85.038 PERSONAL HISTORY OF COLON CANCER: ICD-10-CM

## 2025-05-22 DIAGNOSIS — R19.4 ALTERED BOWEL HABITS: ICD-10-CM

## 2025-05-22 PROCEDURE — 6360000004 HC RX CONTRAST MEDICATION: Performed by: FAMILY MEDICINE

## 2025-05-22 PROCEDURE — 74177 CT ABD & PELVIS W/CONTRAST: CPT

## 2025-05-22 PROCEDURE — 74178 CT ABD&PLV WO CNTR FLWD CNTR: CPT

## 2025-05-22 RX ORDER — IOPAMIDOL 755 MG/ML
100 INJECTION, SOLUTION INTRAVASCULAR ONCE
Status: COMPLETED | OUTPATIENT
Start: 2025-05-22 | End: 2025-05-22

## 2025-05-22 RX ADMIN — IOPAMIDOL 100 ML: 755 INJECTION, SOLUTION INTRAVENOUS at 12:24

## 2025-07-08 ENCOUNTER — HOSPITAL ENCOUNTER (OUTPATIENT)
Facility: HOSPITAL | Age: 85
Discharge: HOME | End: 2025-07-11
Payer: MEDICARE

## 2025-07-08 DIAGNOSIS — C34.11 MALIGNANT NEOPLASM OF UPPER LOBE OF RIGHT LUNG (HCC): ICD-10-CM

## 2025-07-08 PROCEDURE — 71250 CT THORAX DX C-: CPT
